# Patient Record
Sex: FEMALE | Race: WHITE | NOT HISPANIC OR LATINO | Employment: UNEMPLOYED | URBAN - NONMETROPOLITAN AREA
[De-identification: names, ages, dates, MRNs, and addresses within clinical notes are randomized per-mention and may not be internally consistent; named-entity substitution may affect disease eponyms.]

---

## 2019-04-05 ENCOUNTER — APPOINTMENT (OUTPATIENT)
Dept: MAMMOGRAPHY | Facility: HOSPITAL | Age: 64
End: 2019-04-05

## 2019-04-12 ENCOUNTER — APPOINTMENT (OUTPATIENT)
Dept: MAMMOGRAPHY | Facility: HOSPITAL | Age: 64
End: 2019-04-12

## 2019-04-17 ENCOUNTER — HOSPITAL ENCOUNTER (OUTPATIENT)
Dept: MAMMOGRAPHY | Facility: HOSPITAL | Age: 64
Discharge: HOME | End: 2019-04-17
Admitting: INTERNAL MEDICINE

## 2019-04-17 DIAGNOSIS — Z12.39 SCREENING BREAST EXAMINATION: ICD-10-CM

## 2019-04-17 PROCEDURE — 77063 BREAST TOMOSYNTHESIS BI: CPT | Performed by: RADIOLOGY

## 2019-04-17 PROCEDURE — 77067 SCR MAMMO BI INCL CAD: CPT | Performed by: RADIOLOGY

## 2019-04-17 PROCEDURE — 77063 BREAST TOMOSYNTHESIS BI: CPT

## 2019-04-17 PROCEDURE — 77067 SCR MAMMO BI INCL CAD: CPT

## 2020-12-28 RX ORDER — DEXAMETHASONE 0.5 MG/1
TABLET ORAL
Qty: 45 TABLET | Refills: 1 | Status: SHIPPED | OUTPATIENT
Start: 2020-12-28 | End: 2021-01-13

## 2020-12-28 NOTE — TELEPHONE ENCOUNTER
Pt called needs a refills on Dexamethasone 005 mg tablet 1 tablet every other day, Dexamethasone 0.75 mg 1 po every other day. Pt last seen 07/13/20 pt net appt 01/13/21

## 2021-01-13 ENCOUNTER — OFFICE VISIT (OUTPATIENT)
Dept: ENDOCRINOLOGY | Facility: CLINIC | Age: 66
End: 2021-01-13

## 2021-01-13 VITALS — WEIGHT: 204 LBS | SYSTOLIC BLOOD PRESSURE: 120 MMHG | DIASTOLIC BLOOD PRESSURE: 78 MMHG | BODY MASS INDEX: 39.84 KG/M2

## 2021-01-13 DIAGNOSIS — E27.49 GLUCOCORTICOID DEFICIENCY (HCC): Primary | Chronic | ICD-10-CM

## 2021-01-13 PROCEDURE — 99442 PR PHYS/QHP TELEPHONE EVALUATION 11-20 MIN: CPT | Performed by: PHYSICIAN ASSISTANT

## 2021-01-13 RX ORDER — DEXAMETHASONE 0.5 MG/1
0.5 TABLET ORAL EVERY OTHER DAY
Start: 2021-01-13 | End: 2021-10-11

## 2021-01-13 RX ORDER — PROMETHAZINE HYDROCHLORIDE 25 MG/1
25 TABLET ORAL 3 TIMES DAILY PRN
COMMUNITY
Start: 2021-01-07

## 2021-01-13 RX ORDER — HYDROXYZINE HYDROCHLORIDE 25 MG/1
25 TABLET, FILM COATED ORAL NIGHTLY
COMMUNITY
Start: 2021-01-07

## 2021-01-13 RX ORDER — DEXAMETHASONE 0.5 MG/1
0.5 TABLET ORAL EVERY OTHER DAY
Start: 2021-01-13 | End: 2021-01-13

## 2021-01-13 RX ORDER — AMLODIPINE BESYLATE 5 MG/1
TABLET ORAL
COMMUNITY
Start: 2020-11-30

## 2021-01-13 RX ORDER — INFLUENZA A VIRUS A/MICHIGAN/45/2015 X-275 (H1N1) ANTIGEN (FORMALDEHYDE INACTIVATED), INFLUENZA A VIRUS A/SINGAPORE/INFIMH-16-0019/2016 IVR-186 (H3N2) ANTIGEN (FORMALDEHYDE INACTIVATED), INFLUENZA B VIRUS B/PHUKET/3073/2013 ANTIGEN (FORMALDEHYDE INACTIVATED), AND INFLUENZA B VIRUS B/MARYLAND/15/2016 BX-69A ANTIGEN (FORMALDEHYDE INACTIVATED) 60; 60; 60; 60 UG/.7ML; UG/.7ML; UG/.7ML; UG/.7ML
INJECTION, SUSPENSION INTRAMUSCULAR
COMMUNITY
End: 2021-01-13

## 2021-01-13 RX ORDER — LIDOCAINE 50 MG/G
PATCH TOPICAL
COMMUNITY
Start: 2020-11-27

## 2021-01-13 RX ORDER — CYANOCOBALAMIN 1000 UG/ML
INJECTION, SOLUTION INTRAMUSCULAR; SUBCUTANEOUS
COMMUNITY
Start: 2020-12-08

## 2021-01-13 RX ORDER — CLONIDINE 0.1 MG/24H
PATCH, EXTENDED RELEASE TRANSDERMAL
COMMUNITY
Start: 2020-12-06

## 2021-01-13 RX ORDER — ESOMEPRAZOLE MAGNESIUM 40 MG/1
CAPSULE, DELAYED RELEASE ORAL
COMMUNITY
Start: 2020-12-01

## 2021-01-13 RX ORDER — HYDROCODONE BITARTRATE AND ACETAMINOPHEN 10; 325 MG/1; MG/1
1 TABLET ORAL 3 TIMES DAILY
COMMUNITY
Start: 2021-01-02

## 2021-01-13 RX ORDER — BUMETANIDE 1 MG/1
TABLET ORAL
COMMUNITY
Start: 2020-11-05

## 2021-01-13 RX ORDER — CITALOPRAM 20 MG/1
TABLET ORAL
COMMUNITY
Start: 2020-12-22

## 2021-01-13 RX ORDER — DEXAMETHASONE 0.5 MG/1
0.5 TABLET ORAL DAILY
Qty: 30 TABLET | Refills: 1 | Status: SHIPPED | OUTPATIENT
Start: 2021-01-13 | End: 2021-10-11

## 2021-01-13 RX ORDER — ERGOCALCIFEROL 1.25 MG/1
CAPSULE ORAL
COMMUNITY
Start: 2020-11-02

## 2021-01-13 RX ORDER — LEVOTHYROXINE SODIUM 0.03 MG/1
TABLET ORAL
COMMUNITY
Start: 2020-12-26

## 2021-01-13 NOTE — PROGRESS NOTES
You have chosen to receive care through a telephone visit. Do you consent to use a telephone visit for your medical care today? Yes         Office Note      Date: 2021  Patient Name: Neelam Gibbs  MRN: [id number]  : 1955    Chief Complaint   Patient presents with   • Hypothyroidism       History of Present Illness:   Neelam Gibbs is a 65 y.o. female who presents today for adrenal insufficiency.  She remains on alternating doses of dexamethasone 0.75 mg and 0.5 mg every other day.  She notes more dizziness in the past week.  She notes occasional nausea and takes phenergan PRN.  She reports that weight is relatively stable.  She reports that energy level is poor, but attributes this to her back pain.  She may get spinal stimulator.  She reports about 60% improvement in pain on the trial.  She reports that she is still notes easy bruising.  She reports this began after starting the dexamethasone.  She did not notice this with the hydrocortisone, but reports that she has felt better on the dexamethasone.  She reports having labs with Dr. Gabriella Pena recently.  She reports being told that labs were okay.  She will fax a copy here.    Subjective      The following portions of the patient's history were reviewed and updated as appropriate: allergies, current medications, past family history, past medical history, past social history, past surgical history and problem list.    Objective     Vitals:    21 1448   BP: 120/78   Weight: 92.5 kg (204 lb)   PainSc:   8   PainLoc: Back     Body mass index is 39.84 kg/m².    Physical Exam      Current Outpatient Medications   Medication Instructions   • amLODIPine (NORVASC) 5 MG tablet No dose, route, or frequency recorded.   • bumetanide (BUMEX) 1 MG tablet Takes on Monday, Wednesday and Friday   • citalopram (CeleXA) 20 MG tablet daily   • cloNIDine (CATAPRES-TTS) 0.1 MG/24HR patch No dose, route, or frequency recorded.   • cyanocobalamin 1000 MCG/ML  injection No dose, route, or frequency recorded.   • dexamethasone (DECADRON) 0.5 mg, Oral, Every Other Day, (alternating days with 0.75mg)   • dexamethasone (DECADRON) 0.75 mg, Oral, Every Other Day, (alternating days with 0.5mg)   • dexamethasone (DECADRON) 0.5 mg, Oral, Daily   • esomeprazole (nexIUM) 40 MG capsule No dose, route, or frequency recorded.   • HYDROcodone-acetaminophen (NORCO)  MG per tablet 1 tablet, Oral, 3 Times Daily   • hydrOXYzine (ATARAX) 25 mg, Oral, Nightly   • levothyroxine (SYNTHROID, LEVOTHROID) 25 MCG tablet No dose, route, or frequency recorded.   • lidocaine (LIDODERM) 5 % No dose, route, or frequency recorded.   • promethazine (PHENERGAN) 25 mg, Oral, 3 Times Daily PRN, As needed   • vitamin D (ERGOCALCIFEROL) 1.25 MG (64943 UT) capsule capsule Tuesday only       Assessment / Plan      Assessment & Plan:  1. Glucocorticoid deficiency (CMS/HCC)  She is stable symptomatically.  We discussed decreasing the dexamethasone to 0.5 mg daily to see if this helps with bruising.  Will send in a temporary supply to a local pharmacy.  She will let me know how she does on the lower dose.  - dexamethasone (DECADRON) 0.5 MG tablet; Take 1 tablet by mouth Daily.  Dispense: 30 tablet; Refill: 1    15 minutes spent on phone with patient.    Return in about 1 year (around 1/13/2022) for Next scheduled follow up.    BENJAMIN Samuels  01/13/2021

## 2021-02-09 ENCOUNTER — APPOINTMENT (OUTPATIENT)
Dept: MAMMOGRAPHY | Facility: HOSPITAL | Age: 66
End: 2021-02-09

## 2021-02-22 ENCOUNTER — HOSPITAL ENCOUNTER (OUTPATIENT)
Dept: MAMMOGRAPHY | Facility: HOSPITAL | Age: 66
Discharge: HOME | End: 2021-02-22
Admitting: INTERNAL MEDICINE

## 2021-02-22 DIAGNOSIS — Z23 IMMUNIZATION DUE: ICD-10-CM

## 2021-02-22 DIAGNOSIS — Z12.31 VISIT FOR SCREENING MAMMOGRAM: ICD-10-CM

## 2021-02-22 PROCEDURE — 77063 BREAST TOMOSYNTHESIS BI: CPT | Performed by: RADIOLOGY

## 2021-02-22 PROCEDURE — 77063 BREAST TOMOSYNTHESIS BI: CPT

## 2021-02-22 PROCEDURE — 77067 SCR MAMMO BI INCL CAD: CPT

## 2021-02-22 PROCEDURE — 77067 SCR MAMMO BI INCL CAD: CPT | Performed by: RADIOLOGY

## 2021-10-11 RX ORDER — DEXAMETHASONE 0.5 MG/1
TABLET ORAL
Qty: 45 TABLET | Refills: 3 | Status: SHIPPED | OUTPATIENT
Start: 2021-10-11 | End: 2022-09-19 | Stop reason: SDUPTHER

## 2022-03-03 ENCOUNTER — TRANSCRIBE ORDERS (OUTPATIENT)
Dept: WOUND CARE | Facility: HOSPITAL | Age: 67
End: 2022-03-03

## 2022-03-03 DIAGNOSIS — I89.0 LYMPHEDEMA: Primary | ICD-10-CM

## 2022-04-04 ENCOUNTER — HOSPITAL ENCOUNTER (OUTPATIENT)
Dept: MAMMOGRAPHY | Facility: HOSPITAL | Age: 67
Discharge: HOME | End: 2022-04-04
Admitting: INTERNAL MEDICINE

## 2022-04-04 DIAGNOSIS — Z12.31 VISIT FOR SCREENING MAMMOGRAM: ICD-10-CM

## 2022-04-04 PROCEDURE — 77063 BREAST TOMOSYNTHESIS BI: CPT | Performed by: RADIOLOGY

## 2022-04-04 PROCEDURE — 77063 BREAST TOMOSYNTHESIS BI: CPT

## 2022-04-04 PROCEDURE — 77067 SCR MAMMO BI INCL CAD: CPT | Performed by: RADIOLOGY

## 2022-04-04 PROCEDURE — 77067 SCR MAMMO BI INCL CAD: CPT

## 2022-04-14 ENCOUNTER — APPOINTMENT (OUTPATIENT)
Dept: OCCUPATIONAL THERAPY | Facility: HOSPITAL | Age: 67
End: 2022-04-14

## 2022-04-25 ENCOUNTER — HOSPITAL ENCOUNTER (OUTPATIENT)
Dept: OCCUPATIONAL THERAPY | Facility: HOSPITAL | Age: 67
Setting detail: THERAPIES SERIES
Discharge: HOME | End: 2022-04-25

## 2022-04-25 DIAGNOSIS — I89.0 LYMPHEDEMA OF BOTH LOWER EXTREMITIES: ICD-10-CM

## 2022-04-25 DIAGNOSIS — Q82.0 HEREDITARY LYMPHEDEMA: Primary | ICD-10-CM

## 2022-04-25 PROCEDURE — 97166 OT EVAL MOD COMPLEX 45 MIN: CPT

## 2022-04-25 PROCEDURE — 97535 SELF CARE MNGMENT TRAINING: CPT

## 2022-04-25 NOTE — THERAPY EVALUATION
"Outpatient Occupational Therapy Lymphedema Initial Evaluation   [facility]     Patient Name: Neelam Gibbs  : 1955  MRN: [id number]  Today's Date: 2022      Visit Date: 2022    Patient Active Problem List   Diagnosis   • Glucocorticoid deficiency (HCC)        Past Medical History:   Diagnosis Date   • Acute renal failure (ARF) (HCC)     after allergic reaction to Bactrim   • Adrenal cortical hypofunction (HCC)    • Anxiety and depression    • Chronic kidney disease (CKD)    • DDD (degenerative disc disease), lumbosacral    • DJD (degenerative joint disease)    • Hyperlipidemia    • Hypothyroidism    • Peripheral neuropathy         Past Surgical History:   Procedure Laterality Date   • BACK SURGERY     • BREAST BIOPSY Left     neg   • EYE SURGERY Bilateral    • SHOULDER SURGERY Left    • TOTAL KNEE ARTHROPLASTY Left    • WRIST SURGERY Right          Visit Dx:     ICD-10-CM ICD-9-CM   1. Hereditary lymphedema  Q82.0 757.0   2. Lymphedema of both lower extremities  I89.0 457.1        Patient History     Row Name 22 1400             History    Chief Complaint Balance Problems;Burn;Numbness;Tightness;Swelling;Pain;Muscle tenderness;Muscle weakness;Joint stiffness;Joint swelling;Impaired sensation;Fatigue/poor endurance;Falls/history of falls;Difficulty with daily activities;Difficulty Walking  -AB      Type of Pain Back pain;Lower Extremity / Leg  -AB      Date Current Problem(s) Began --  ~2 years ago  -AB      Brief Description of Current Complaint Pt. reports swelling that began around 2 years ago. She noticed that symptoms worsened after kidney failures. It has gotten progressively worse and causes increased tenderness and pain.  -AB      Patient/Caregiver Goals Relieve pain;Return to prior level of function;Improve mobility;Improve strength;Know what to do to help the symptoms;Decrease swelling  'improve balance so I'm not scared of walking\"  -AB      Current Tobacco Use yes  -AB      " Smoking Status none  -AB      Patient's Rating of General Health Poor  -AB      Hand Dominance right-handed  -AB      Patient seeing anyone else for problem(s)? PCP  -AB      How has patient tried to help current problem? cold compresses, ice baths, aspercream w/ lidocaine  -AB      What clinical tests have you had for this problem? --  none  -AB      Related/Recent Hospitalizations No  -AB      Are you or can you be pregnant No  -AB              Pain     Pain Location Back;Leg;Foot;Knee  -AB      Pain at Present 0  Reports taking medication prior to coming for evaluation.  -AB      Pain at Best 0  -AB      Pain at Worst 10  -AB      Pain Frequency Constant/continuous  -AB      Pain Description Burning;Cramping;Discomfort;Heaviness;Numbness;Pressure;Squeezing;Sore;Spasm;Radiating;Tender;Throbbing;Tightness;Tiring;Pins and needles  -AB      What Performance Factors Make the Current Problem(s) WORSE? standing for long periods of time, walking  -AB      What Performance Factors Make the Current Problem(s) BETTER? pain medication, elevation  -AB      Is your sleep disturbed? Yes  -AB      What position do you sleep in? Other (comment)  sitting up in a recliner  -AB      Difficulties at work? retired/disabled  -AB      Difficulties with ADL's? yes  -AB      Difficulties with recreational activities? yes  -AB              Fall Risk Assessment    Any falls in the past year: Yes  -AB      Number of falls reported in the last 12 months 2  -AB      Factors that contributed to the fall: Tripped;Lost balance;Uneven surface;Slippery surface  -AB      Does patient have a fear of falling Yes (comment)  -AB              Services    Prior Rehab/Home Health Experiences Yes  -AB      Where was the prior experience with Rehab/Home Health [facility]  -AB      Are you currently receiving Home Health services No  -AB      Do you plan to receive Home Health services in the near future No  -AB              Daily Activities    Primary  Language English  -AB      Are you able to read Yes  -AB      Are you able to write Yes  -AB      Patient is concerned about/has problems with Bed Mobility;Climbing Stairs;Coordination;Difficulty with self care (i.e. bathing, dressing, toileting:;Flexibility;Performing home management (household chores, shopping, care of dependents);Walking;Grasping objects lifting;Repetitive movements of the hand, arm, shoulder;Reaching over head  -AB      Does patient have problems with the following? Depression;Anxiety  -AB              Safety    Are you being hurt, hit, or frightened by anyone at home or in your life? No  -AB      Are you being neglected by a caregiver No  -AB      Have you had any of the following issues with Depression;Anxiety  -AB            User Key  (r) = Recorded By, (t) = Taken By, (c) = Cosigned By    Initials Name Provider Type    Felisa Hardwick OT Occupational Therapist                 Lymphedema     Row Name 04/25/22 1400             Subjective Pain    Able to rate subjective pain? yes  -AB      Pre-Treatment Pain Level 0  -AB      Subjective Pain Comment w/ pain medication  -AB              Subjective Comments    Subjective Comments Pt. presents for initial evaluation this date w/ s/s of lymphedema in BLE's and trunk. Pt. reports that she has been experiencing swelling for the past two years that has gotten progressively worse w/ increased pain.  -AB              Lymphedema Assessment    Lymphedema Classification RLE:;LLE:;Trunk:;stage 2 (Spontaneously Irreversible);secondary  -AB      Lymphedema Precautions kidney disease  -AB              LLIS - Physical Concerns    The amount of pain associated with my lymphedema is: 4  -AB      The amount of limb heaviness associated with my lymphedema is: 4  -AB      The amount of skin tightness associated with my lymphedema is: 4  -AB      The size of my swollen limb(s) seems: 4  -AB      Lymphedema affects the movement of my swollen limb(s): 4   "-AB      The strength in my swollen limb(s) is: 4  -AB              LLIS - Psychosocial Concerns    Lymphedema affects my body image (i.e., \"how I think I look\"). 4  -AB      Lymphedema affects my socializing with others. 4  -AB      Lymphedema affects my intimate relations with spouse or partner (rate 0 if not applicable 4  -AB      Lymphedema \"gets me down\" (i.e., depression, frustration, or anger) 4  -AB      I must rely on others for help due to my lymphedema. 3  -AB      I know what to do to manage my lymphedema 1  -AB              LLIS - Functional Concerns    Lymphedema affects my ability to perform self-care activities (i.e. eating, dressing, hygiene) 2  -AB      Lymphedema affects my ability to perform routine home or work-related activities. 3  -AB      Lymphedema affects my performance of preferred leisure activities. 4  -AB      Lymphedema affects proper fit of clothing/shoes 4  -AB      Lymphedema affects my sleep 4  -AB              General ROM    GENERAL ROM COMMENTS BUE AROM impaired ~50%, BLE's impaired ~75%  -AB              Lymphedema Edema Assessment    Ptting Edema Category By grade out of 4;By severity  -AB      Pitting Edema + 4/4;Severe  -AB      Stemmer Sign bilateral:;negative  -AB      [place] Hump bilateral:;negative  -AB              Skin Changes/Observations    Location/Assessment Lower Extremity  -AB      Lower Extremity Conditions bilateral:;hairless;shiny;clean  -AB      Lower Extremity Color/Pigment bilateral:;red;purple;non-blanchable;hypopigmented;brawny;woody  -AB      Skin Observations Comment blister noted to RLE shin  -AB              Lymphedema Sensation    Lymphedema Sensation Reports RLE:;numbness;tingling  -AB      Lymphedema Sensation Tests light touch  -AB      Lymphedema Light Touch RLE:;absent sensation;LLE:;mild impairment  -AB              Lymphedema Pulses/Capillary Refill    Lymphedema Pulses/Capillary Refill capillary refill  -AB      Capillary Refill lower " extremity capillary refill  -AB      Lower Extremity Capillary Refill left:;right:;less than 3 seconds  -AB              Lymphedema Measurements    Measurement Type(s) Quick Girth;Circumferential  -AB      Quick Girth Areas Lower extremities  -AB      Circumferential Areas Trunk  -AB              LLE Quick Girth (cm)    Met-heads 20 cm  -AB      Mid foot 23 cm  -AB      Smallest ankle 27 cm  -AB      Largest calf 46.4 cm  -AB      Tib tuberosity 48.3 cm  -AB      Mid patella 56 cm  -AB      Distal thigh 62.4 cm  -AB      Other 1 35.7 cm  -AB      Other 2 42.2 cm  -AB              RLE Quick Girth (cm)    Met-heads 19.7 cm  -AB      Mid foot 23 cm  -AB      Smallest ankle 27.1 cm  -AB      Largest calf 47.8 cm  -AB      Tib tuberosity 52.2 cm  -AB      Mid patella 56.5 cm  -AB      Distal thigh 66 cm  -AB      Other 1 41 cm  -AB      Other 2 45.8 cm  -AB      RLE Quick Girth Total 379.1  -AB              Trunk Circumferential (cm)    Measurement Location 1 Navel  -AB      Trunk 1 131.5 cm  -AB      Trunk Circumferential Total 131.5 cm  -AB              Lymphedema Life Impact Scale Totals    A.  Total Q1 - Q17 (Do not include Q18) 61  -AB      B.  Total number of questions answered (Q1-Q17) 17  -AB      C. Divide A by B 3.59  -AB      D. Multiple C by 25 89.75  -AB            User Key  (r) = Recorded By, (t) = Taken By, (c) = Cosigned By    Initials Name Provider Type    Felisa Hardwick, OT Occupational Therapist                        Therapy Education  Given: HEP, Edema management, Symptoms/condition management  Program: New  How Provided: Verbal, Demonstration  Provided to: Patient  Level of Understanding: Verbalized         OT Goals     Row Name 04/25/22 1600          OT Short Term Goals    STG Date to Achieve 05/25/22  -AB     STG 1 Pt. will be familiar w/ precautions, skin care, and self management of lymphedema.  -AB     STG 2 Pt. will decrease pitting edema to3to4/4 to reduce risk of infection.  -AB      STG 3 Pt. will reduce overall girth by 5cm to decrease risk of infection.  -AB     STG 4 Pt. will be familiar w/ HEP to assist w/ improved lymphatic flow.  -AB            Long Term Goals    LTG Date to Achieve 07/25/22  -AB     LTG 1 Pt./ will be independent w/ short stretch compression bandaging for girth reduction.  -AB     LTG 2 Pt. will decrease pitting edema to 3/4 to reduce risk of infection.  -AB     LTG 3 Pt. will reduce overall girth by 10cm to decrease risk of infection.  -AB     LTG 4 Pt./ will be independent w/ donning/doffing compression garment.  -AB     LTG 5 Pt. will be independent w/ lymphedema management and HEP.  -AB            Time Calculation    OT Goal Re-Cert Due Date 07/25/22  -AB           User Key  (r) = Recorded By, (t) = Taken By, (c) = Cosigned By    Initials Name Provider Type    Felisa Hardwick, OT Occupational Therapist                 OT Assessment/Plan     Row Name 04/25/22 1639          OT Assessment    Functional Limitations Decreased safety during functional activities;Limitations in functional capacity and performance;Performance in leisure activities;Impaired gait;Performance in self-care ADL;Limitation in home management  -AB     Impairments Balance;Coordination;Edema;Endurance;Gait;Impaired flexibility;Impaired lymphatic circulation;Muscle strength;Pain;Range of motion;Sensation  -AB     OT Diagnosis Lymphedema  -AB     OT Rehab Potential Good  -AB     Patient/caregiver participated in establishment of treatment plan and goals Yes  -AB     Patient would benefit from skilled therapy intervention Yes  -AB            OT Plan    OT Frequency 2x/week  -AB     Predicted Duration of Therapy Intervention (OT) 90 days  -AB     Planned CPT's? OT EVAL MOD COMPLEXITY: 95722;OT RE-EVAL: 26983;OT THER ACT EA 15 MIN: 49168DL;OT THER PROC EA 15 MIN: 93281AM;OT SELF CARE/MGMT/TRAIN 15 MIN: 17487;OT MANUAL THERAPY EA 15 MIN: 36773;OT VASOPNEUMATIC DEVICE: 15653   lymphedema management  -AB     Planned Therapy Interventions (Optional Details) home exercise program;patient/family education;manual therapy techniques  -AB     OT Plan Comments Pt. will benefit from skilled OT services to address s/s of lymphedema in BLE's, requiring tx 2x/week for approximately 90 days.  -AB           User Key  (r) = Recorded By, (t) = Taken By, (c) = Cosigned By    Initials Name Provider Type    AB Felisa Da Silva, OT Occupational Therapist                          Time Calculation:   OT Start Time: 1350  OT Stop Time: 1500  OT Time Calculation (min): 70 min  OT Non-Billable Time (min): 60 min  Total Timed Code Minutes- OT: 70 minute(s)     Therapy Charges for Today     Code Description Service Date Service Provider Modifiers Qty    38560773522  OT EVAL MOD COMPLEXITY 4 4/25/2022 Felisa Da Silva OT GO, KX 1    [id number]  OT SELF CARE/MGMT/TRAIN EA 15 MIN 4/25/2022 Felisa Da Silva OT GO, KX 1                    Felisa Da Silva OT  4/25/2022

## 2022-04-27 ENCOUNTER — HOSPITAL ENCOUNTER (OUTPATIENT)
Dept: OCCUPATIONAL THERAPY | Facility: HOSPITAL | Age: 67
Setting detail: THERAPIES SERIES
Discharge: HOME | End: 2022-04-27

## 2022-04-27 DIAGNOSIS — Q82.0 HEREDITARY LYMPHEDEMA: Primary | ICD-10-CM

## 2022-04-27 DIAGNOSIS — I89.0 LYMPHEDEMA OF BOTH LOWER EXTREMITIES: ICD-10-CM

## 2022-04-27 PROCEDURE — 97140 MANUAL THERAPY 1/> REGIONS: CPT

## 2022-04-27 NOTE — THERAPY TREATMENT NOTE
Outpatient Occupational Therapy Lymphedema Treatment Note  [facility]     Patient Name: Neelam Gibbs  : 1955  MRN: [id number]  Today's Date: 2022      Visit Date: 2022    Patient Active Problem List   Diagnosis   • Glucocorticoid deficiency (HCC)        Past Medical History:   Diagnosis Date   • Acute renal failure (ARF) (HCC)     after allergic reaction to Bactrim   • Adrenal cortical hypofunction (HCC)    • Anxiety and depression    • Chronic kidney disease (CKD)    • DDD (degenerative disc disease), lumbosacral    • DJD (degenerative joint disease)    • Hyperlipidemia    • Hypothyroidism    • Peripheral neuropathy         Past Surgical History:   Procedure Laterality Date   • BACK SURGERY     • BREAST BIOPSY Left     neg   • EYE SURGERY Bilateral    • SHOULDER SURGERY Left    • TOTAL KNEE ARTHROPLASTY Left    • WRIST SURGERY Right          Visit Dx:      ICD-10-CM ICD-9-CM   1. Hereditary lymphedema  Q82.0 757.0   2. Lymphedema of both lower extremities  I89.0 457.1        Lymphedema     Row Name 22 0900             Subjective Pain    Able to rate subjective pain? yes  -AB      Pre-Treatment Pain Level 8  -AB      Post-Treatment Pain Level 4  -AB      Subjective Pain Comment back  -AB              Subjective Comments    Subjective Comments Pt. presents for initial treatment this date w/ c/o 810 back pain. She reports that her legs are tender to touch.  -AB              Lymphedema Assessment    Lymphedema Classification RLE:;LLE:;Trunk:;stage 2 (Spontaneously Irreversible);secondary  -AB      Lymphedema Precautions kidney disease  -AB              Lymphedema Edema Assessment    Ptting Edema Category By grade out of 4;By severity  -AB      Pitting Edema + 4/4;Severe  -AB      Stemmer Sign bilateral:;negative  -AB      [place] Hump bilateral:;negative  -AB              Skin Changes/Observations    Location/Assessment Lower Extremity  -AB      Lower Extremity Conditions  bilateral:;hairless;shiny;clean  -AB      Lower Extremity Color/Pigment bilateral:;red;purple;non-blanchable;hypopigmented;brawny;woody  -AB              Lymphedema Sensation    Lymphedema Sensation Reports RLE:;numbness;tingling  -AB      Lymphedema Sensation Tests light touch  -AB      Lymphedema Light Touch RLE:;absent sensation;LLE:;mild impairment  -AB              Lymphedema Pulses/Capillary Refill    Lymphedema Pulses/Capillary Refill capillary refill  -AB      Capillary Refill lower extremity capillary refill  -AB      Lower Extremity Capillary Refill left:;right:;less than 3 seconds  -AB              Lymphedema Measurements    Measurement Type(s) Quick Girth;Circumferential  -AB      Quick Girth Areas Lower extremities  -AB      Circumferential Areas Trunk  -AB              Trunk Circumferential (cm)    Measurement Location 1 Navel  -AB              Manual Lymphatic Drainage    Manual Lymphatic Drainage extremity treatment;opened regional lymph nodes  -AB      Opened Regional Lymph Nodes inguinal  -AB      Extremity Treatment MLD to full limb  -AB      MLD to Full Limb BLE's  -AB      Manual Therapy MLD to BLE's, abdomen, inguinals  -AB              Compression/Skin Care    Compression/Skin Care skin care;wrapping location;bandaging  -AB      Skin Care moisturizing lotion applied  -AB      Wrapping Location lower extremity  -AB      Wrapping Location LE bilateral:  ankles to knees, peha haft to feet only  -AB      Bandage Layers short-stretch bandages (comment size/quantity);cotton elastic stocking- single layer (comment size);soft foam- 1/4 inch  -AB      Bandaging Technique light compression;moderate compression;circumferential/spiral  -AB      Compression/Skin Care Comments compression pump to BLE's  -AB            User Key  (r) = Recorded By, (t) = Taken By, (c) = Cosigned By    Initials Name Provider Type    AB Felisa Da Silva, OT Occupational Therapist                                       Therapy Education  Given: HEP, Edema management, Symptoms/condition management, Bandaging/dressing change  Program: New  How Provided: Verbal, Demonstration  Provided to: Patient  Level of Understanding: Verbalized                Time Calculation:   OT Start Time: 0900  OT Stop Time: 1000  OT Time Calculation (min): 60 min  OT Non-Billable Time (min): 25 min  Total Timed Code Minutes- OT: 60 minute(s)     Therapy Charges for Today     Code Description Service Date Service Provider Modifiers Qty    [id number] HC OT MANUAL THERAPY EA 15 MIN 4/27/2022 Felisa Da Silva, CARLITA GO, KX 4                      Felisa Da Silva OT  4/27/2022

## 2022-05-02 ENCOUNTER — HOSPITAL ENCOUNTER (OUTPATIENT)
Dept: OCCUPATIONAL THERAPY | Facility: HOSPITAL | Age: 67
Setting detail: THERAPIES SERIES
Discharge: HOME | End: 2022-05-02

## 2022-05-02 DIAGNOSIS — Q82.0 HEREDITARY LYMPHEDEMA: Primary | ICD-10-CM

## 2022-05-02 DIAGNOSIS — I89.0 LYMPHEDEMA OF BOTH LOWER EXTREMITIES: ICD-10-CM

## 2022-05-02 PROCEDURE — 97140 MANUAL THERAPY 1/> REGIONS: CPT

## 2022-05-02 PROCEDURE — 97139 UNLISTED THERAPEUTIC PX: CPT

## 2022-05-02 NOTE — THERAPY TREATMENT NOTE
Outpatient Occupational Therapy Lymphedema Treatment Note  TEVIN Gilliam     Patient Name: Neelam Gibbs  : 1955  MRN: [id number]  Today's Date: 2022      Visit Date: 2022    Patient Active Problem List   Diagnosis   • Glucocorticoid deficiency (HCC)        Past Medical History:   Diagnosis Date   • Acute renal failure (ARF) (HCC)     after allergic reaction to Bactrim   • Adrenal cortical hypofunction (HCC)    • Anxiety and depression    • Chronic kidney disease (CKD)    • DDD (degenerative disc disease), lumbosacral    • DJD (degenerative joint disease)    • Hyperlipidemia    • Hypothyroidism    • Peripheral neuropathy         Past Surgical History:   Procedure Laterality Date   • BACK SURGERY     • BREAST BIOPSY Left     neg   • EYE SURGERY Bilateral    • SHOULDER SURGERY Left    • TOTAL KNEE ARTHROPLASTY Left    • WRIST SURGERY Right          Visit Dx:      ICD-10-CM ICD-9-CM   1. Hereditary lymphedema  Q82.0 757.0   2. Lymphedema of both lower extremities  I89.0 457.1        Lymphedema     Row Name 22 1300             Subjective Pain    Able to rate subjective pain? yes  -AB      Pre-Treatment Pain Level 6  -AB      Subjective Pain Comment back  -AB              Subjective Comments    Subjective Comments Pt. presents for tx this date w/ multi-layer bandages donned bilaterally.  -AB              Lymphedema Assessment    Lymphedema Classification RLE:;LLE:;Trunk:;stage 2 (Spontaneously Irreversible);secondary  -AB      Lymphedema Precautions kidney disease  -AB              Lymphedema Edema Assessment    Ptting Edema Category By grade out of 4;By severity  -AB      Pitting Edema + 4/4;Severe  -AB      Stemmer Sign bilateral:;negative  -AB      [place] Hump bilateral:;negative  -AB              Skin Changes/Observations    Location/Assessment Lower Extremity  -AB      Lower Extremity Conditions bilateral:;hairless;shiny;clean  -AB      Lower Extremity Color/Pigment  bilateral:;red;purple;non-blanchable;hypopigmented;brawny;woody  -AB              Lymphedema Sensation    Lymphedema Sensation Reports RLE:;numbness;tingling  -AB      Lymphedema Sensation Tests light touch  -AB      Lymphedema Light Touch RLE:;absent sensation;LLE:;mild impairment  -AB              Lymphedema Pulses/Capillary Refill    Lymphedema Pulses/Capillary Refill capillary refill  -AB      Capillary Refill lower extremity capillary refill  -AB      Lower Extremity Capillary Refill left:;right:;less than 3 seconds  -AB              Lymphedema Measurements    Measurement Type(s) Quick Girth;Circumferential  -AB      Quick Girth Areas Lower extremities  -AB      Circumferential Areas Trunk  -AB              LLE Quick Girth (cm)    Met-heads 20.2 cm  -AB      Mid foot 22.4 cm  -AB      Smallest ankle 25.8 cm  -AB      Largest calf 40.8 cm  -AB      Tib tuberosity 45.3 cm  -AB      Mid patella 55.5 cm  -AB      Distal thigh 65.5 cm  -AB      Other 1 27.5 cm  -AB      Other 2 34.5 cm  -AB              RLE Quick Girth (cm)    Met-heads 20 cm  -AB      Mid foot 22 cm  -AB      Smallest ankle 25.2 cm  -AB      Largest calf 47.2 cm  -AB      Tib tuberosity 55 cm  -AB      Mid patella 56.8 cm  -AB      Distal thigh 68.8 cm  -AB      Other 1 32.5 cm  -AB      Other 2 39.1 cm  -AB      RLE Quick Girth Total 366.6  -AB              Trunk Circumferential (cm)    Measurement Location 1 Navel  -AB              Manual Lymphatic Drainage    Manual Lymphatic Drainage extremity treatment;opened regional lymph nodes  -AB      Opened Regional Lymph Nodes inguinal  -AB      Extremity Treatment MLD to full limb  -AB      MLD to Full Limb BLE's  -AB      Manual Therapy MLD to BLE's, abdomen, inguinals  -AB              Compression/Skin Care    Compression/Skin Care skin care;wrapping location;bandaging  -AB      Skin Care moisturizing lotion applied  -AB      Wrapping Location lower extremity  -AB      Wrapping Location LE bilateral:   base of toes to knees - peha haft to toes/feet  -AB      Bandage Layers short-stretch bandages (comment size/quantity);cotton elastic stocking- single layer (comment size);soft foam- 1/4 inch  -AB      Bandaging Technique light compression;moderate compression;circumferential/spiral  -AB      Compression/Skin Care Comments compression pump to BLE's  -AB            User Key  (r) = Recorded By, (t) = Taken By, (c) = Cosigned By    Initials Name Provider Type    AB Felisa Da Silva OT Occupational Therapist                                      Therapy Education  Given: HEP, Edema management, Symptoms/condition management, Bandaging/dressing change  Program: Reinforced  How Provided: Verbal, Demonstration  Provided to: Patient  Level of Understanding: Verbalized                Time Calculation:   OT Start Time: 1305  OT Stop Time: 1405  OT Time Calculation (min): 60 min  OT Non-Billable Time (min): 25 min  Total Timed Code Minutes- OT: 60 minute(s)     Therapy Charges for Today     Code Description Service Date Service Provider Modifiers Qty    36024510335 HC OT MANUAL THERAPY EA 15 MIN 5/2/2022 Felisa Da Silva OT GO, KX 3    78278621037 HC OT LYMPHEDEMA MANAGEMENT-15 MIN 5/2/2022 Felisa Da Silva OT KX 1                      Felisa Da Silva OT  5/2/2022

## 2022-05-05 ENCOUNTER — HOSPITAL ENCOUNTER (OUTPATIENT)
Dept: OCCUPATIONAL THERAPY | Facility: HOSPITAL | Age: 67
Setting detail: THERAPIES SERIES
Discharge: HOME | End: 2022-05-05

## 2022-05-05 DIAGNOSIS — I89.0 LYMPHEDEMA OF BOTH LOWER EXTREMITIES: ICD-10-CM

## 2022-05-05 DIAGNOSIS — Q82.0 HEREDITARY LYMPHEDEMA: Primary | ICD-10-CM

## 2022-05-05 PROCEDURE — 97139 UNLISTED THERAPEUTIC PX: CPT

## 2022-05-05 PROCEDURE — 97140 MANUAL THERAPY 1/> REGIONS: CPT

## 2022-05-05 PROCEDURE — 97535 SELF CARE MNGMENT TRAINING: CPT

## 2022-05-05 NOTE — THERAPY TREATMENT NOTE
Outpatient Occupational Therapy Lymphedema Treatment Note  TEVIN Gilliam     Patient Name: Neelam Gibbs  : 1955  MRN: [id number]  Today's Date: 2022      Visit Date: 2022    Patient Active Problem List   Diagnosis   • Glucocorticoid deficiency (HCC)        Past Medical History:   Diagnosis Date   • Acute renal failure (ARF) (HCC)     after allergic reaction to Bactrim   • Adrenal cortical hypofunction (HCC)    • Anxiety and depression    • Chronic kidney disease (CKD)    • DDD (degenerative disc disease), lumbosacral    • DJD (degenerative joint disease)    • Hyperlipidemia    • Hypothyroidism    • Peripheral neuropathy         Past Surgical History:   Procedure Laterality Date   • BACK SURGERY     • BREAST BIOPSY Left     neg   • EYE SURGERY Bilateral    • SHOULDER SURGERY Left    • TOTAL KNEE ARTHROPLASTY Left    • WRIST SURGERY Right          Visit Dx:      ICD-10-CM ICD-9-CM   1. Hereditary lymphedema  Q82.0 757.0   2. Lymphedema of both lower extremities  I89.0 457.1        Lymphedema     Row Name 22 1300             Subjective Pain    Able to rate subjective pain? yes  -BC      Pre-Treatment Pain Level 5  -BC      Subjective Pain Comment Back  -BC              Subjective Comments    Subjective Comments Pt. reports that her legs itched a lot and she tried not to scratch.  -BC              Lymphedema Assessment    Lymphedema Classification RLE:;LLE:;Trunk:;stage 2 (Spontaneously Irreversible);secondary  -BC      Lymphedema Precautions kidney disease  -BC              Lymphedema Edema Assessment    Ptting Edema Category By grade out of 4;By severity  -BC      Pitting Edema + 4/4;Severe  -BC      Stemmer Sign bilateral:;negative  -BC      [place] Hump bilateral:;negative  -BC              Skin Changes/Observations    Location/Assessment Lower Extremity  -BC      Lower Extremity Conditions bilateral:;hairless;shiny;clean  -BC      Lower Extremity Color/Pigment  bilateral:;red;purple;non-blanchable;hypopigmented;brawny;woody  -BC              Lymphedema Sensation    Lymphedema Sensation Reports RLE:;numbness;tingling  -BC      Lymphedema Sensation Tests light touch  -BC      Lymphedema Light Touch RLE:;absent sensation;LLE:;mild impairment  -BC              Lymphedema Pulses/Capillary Refill    Lymphedema Pulses/Capillary Refill capillary refill  -BC      Capillary Refill lower extremity capillary refill  -BC      Lower Extremity Capillary Refill left:;right:;less than 3 seconds  -BC              Lymphedema Measurements    Measurement Type(s) Quick Girth;Circumferential  -BC      Quick Girth Areas Lower extremities  -BC      Circumferential Areas Trunk  -BC              Trunk Circumferential (cm)    Measurement Location 1 Navel  -BC              Manual Lymphatic Drainage    Manual Lymphatic Drainage extremity treatment;opened regional lymph nodes  -BC      Opened Regional Lymph Nodes inguinal  -BC      Extremity Treatment MLD to full limb  -BC      MLD to Full Limb BLE's  -BC      Manual Therapy MLD to BLE's, abd.  -BC              Compression/Skin Care    Compression/Skin Care skin care;wrapping location;bandaging  -BC      Skin Care moisturizing lotion applied  -BC      Wrapping Location lower extremity  -BC      Wrapping Location LE bilateral:  base of toes to knees - peha haft to toes/feet  -BC      Bandage Layers short-stretch bandages (comment size/quantity);cotton elastic stocking- single layer (comment size);soft foam- 1/4 inch  -BC      Bandaging Technique light compression;moderate compression;circumferential/spiral  -BC      Compression/Skin Care Comments Compression pump x BLE's  -BC            User Key  (r) = Recorded By, (t) = Taken By, (c) = Cosigned By    Initials Name Provider Type    Nicolasa Rizo OT Occupational Therapist                         OT Assessment/Plan     Row Name 05/05/22 1024          OT Assessment    Assessment Comments Pt..  positioned in supported recline for manual lymph drainage, compression pump, skin care and multi layered bandaging of BLE's.  -BC           User Key  (r) = Recorded By, (t) = Taken By, (c) = Cosigned By    Initials Name Provider Type    BC Nicolasa Reid OT Occupational Therapist                          Therapy Education  Given: Symptoms/condition management  Program: Reinforced  How Provided: Verbal  Provided to: Patient  Level of Understanding: Verbalized                Time Calculation:   OT Start Time: 1300  OT Stop Time: 1400  OT Time Calculation (min): 60 min     Therapy Charges for Today     Code Description Service Date Service Provider Modifiers Qty    00121406326 HC OT MANUAL THERAPY EA 15 MIN 5/5/2022 Nicolasa Reid OT GO, KX 2    47930940179 HC OT LYMPHEDEMA MANAGEMENT-15 MIN 5/5/2022 Nicolasa Reid OT KX 1    12986717917 HC OT SELF CARE/MGMT/TRAIN EA 15 MIN 5/5/2022 Nicolasa Reid OT GO, KX 1                      Nicolasa Reid OT  5/5/2022

## 2022-05-10 ENCOUNTER — HOSPITAL ENCOUNTER (OUTPATIENT)
Dept: OCCUPATIONAL THERAPY | Facility: HOSPITAL | Age: 67
Setting detail: THERAPIES SERIES
Discharge: HOME | End: 2022-05-10

## 2022-05-10 DIAGNOSIS — Q82.0 HEREDITARY LYMPHEDEMA: Primary | ICD-10-CM

## 2022-05-10 DIAGNOSIS — I89.0 LYMPHEDEMA OF BOTH LOWER EXTREMITIES: ICD-10-CM

## 2022-05-10 PROCEDURE — 97140 MANUAL THERAPY 1/> REGIONS: CPT

## 2022-05-10 PROCEDURE — 97139 UNLISTED THERAPEUTIC PX: CPT

## 2022-05-10 NOTE — THERAPY TREATMENT NOTE
Outpatient Occupational Therapy Lymphedema Treatment Note  TEVIN Gilliam     Patient Name: Neelam Gibbs  : 1955  MRN: [id number]  Today's Date: 5/10/2022      Visit Date: 05/10/2022    Patient Active Problem List   Diagnosis   • Glucocorticoid deficiency (HCC)        Past Medical History:   Diagnosis Date   • Acute renal failure (ARF) (HCC)     after allergic reaction to Bactrim   • Adrenal cortical hypofunction (HCC)    • Anxiety and depression    • Chronic kidney disease (CKD)    • DDD (degenerative disc disease), lumbosacral    • DJD (degenerative joint disease)    • Hyperlipidemia    • Hypothyroidism    • Peripheral neuropathy         Past Surgical History:   Procedure Laterality Date   • BACK SURGERY     • BREAST BIOPSY Left     neg   • EYE SURGERY Bilateral    • SHOULDER SURGERY Left    • TOTAL KNEE ARTHROPLASTY Left    • WRIST SURGERY Right          Visit Dx:      ICD-10-CM ICD-9-CM   1. Hereditary lymphedema  Q82.0 757.0   2. Lymphedema of both lower extremities  I89.0 457.1        Lymphedema     Row Name 05/10/22 1300             Subjective Pain    Able to rate subjective pain? yes  -AB      Pre-Treatment Pain Level 8  -AB      Subjective Pain Comment back, BLE's  -AB              Subjective Comments    Subjective Comments Pt. presents to tx w/ no compression donned. She reports that she removed her bandages this morning because her legs were hurting.  -AB              Lymphedema Assessment    Lymphedema Classification RLE:;LLE:;Trunk:;stage 2 (Spontaneously Irreversible);secondary  -AB      Lymphedema Precautions kidney disease  -AB              Lymphedema Edema Assessment    Ptting Edema Category By grade out of 4;By severity  -AB      Pitting Edema + 4/4;Severe  -AB      Stemmer Sign bilateral:;negative  -AB      [place] Hump bilateral:;negative  -AB              Skin Changes/Observations    Location/Assessment Lower Extremity  -AB      Lower Extremity Conditions  bilateral:;hairless;shiny;clean  -AB      Lower Extremity Color/Pigment bilateral:;red;purple;non-blanchable;hypopigmented;brawny;woody  -AB              Lymphedema Sensation    Lymphedema Sensation Reports RLE:;numbness;tingling  -AB      Lymphedema Sensation Tests light touch  -AB      Lymphedema Light Touch RLE:;absent sensation;LLE:;mild impairment  -AB              Lymphedema Pulses/Capillary Refill    Lymphedema Pulses/Capillary Refill capillary refill  -AB      Capillary Refill lower extremity capillary refill  -AB      Lower Extremity Capillary Refill left:;right:;less than 3 seconds  -AB              Lymphedema Measurements    Measurement Type(s) Quick Girth;Circumferential  -AB      Quick Girth Areas Lower extremities  -AB      Circumferential Areas Trunk  -AB              LLE Quick Girth (cm)    Met-heads 19.2 cm  -AB      Mid foot 21.4 cm  -AB      Smallest ankle 25.4 cm  -AB      Largest calf 42 cm  -AB      Tib tuberosity 46 cm  -AB      Mid patella 55.7 cm  -AB      Other 1 28.2 cm  -AB      Other 2 35.5 cm  -AB              RLE Quick Girth (cm)    Met-heads 18.8 cm  -AB      Mid foot 20.7 cm  -AB      Smallest ankle 24.6 cm  -AB      Largest calf 43.6 cm  -AB      Tib tuberosity 51.9 cm  -AB      Mid patella 54.6 cm  -AB      Other 1 32.9 cm  -AB      Other 2 38.6 cm  -AB      RLE Quick Girth Total 285.7  -AB              Trunk Circumferential (cm)    Measurement Location 1 Navel  -AB              Manual Lymphatic Drainage    Manual Lymphatic Drainage extremity treatment;opened regional lymph nodes  -AB      Opened Regional Lymph Nodes inguinal  -AB      Extremity Treatment MLD to full limb  -AB      MLD to Full Limb BLE's  -AB      Manual Lymphatic Drainage Comments vibration to BLEs w/ hypersensitivity noted  -AB      Manual Therapy MLD to BLE's, abdomen, inguinals  -AB              Compression/Skin Care    Compression/Skin Care skin care;wrapping location;bandaging  -AB      Skin Care moisturizing  lotion applied  -AB      Wrapping Location lower extremity  -AB      Wrapping Location LE bilateral:  base of toes to knees  -AB      Bandage Layers short-stretch bandages (comment size/quantity);cotton elastic stocking- single layer (comment size);soft foam- 1/4 inch  -AB      Bandaging Technique light compression;moderate compression;circumferential/spiral  -AB      Compression/Skin Care Comments compression pump to BLE's  -AB            User Key  (r) = Recorded By, (t) = Taken By, (c) = Cosigned By    Initials Name Provider Type    Felisa Hardwick OT Occupational Therapist                                      Therapy Education  Given: HEP, Edema management, Symptoms/condition management, Bandaging/dressing change  Program: Reinforced  How Provided: Verbal, Demonstration  Provided to: Patient  Level of Understanding: Verbalized                Time Calculation:   OT Start Time: 1300  OT Stop Time: 1410  OT Time Calculation (min): 70 min  OT Non-Billable Time (min): 25 min  Total Timed Code Minutes- OT: 70 minute(s)     Therapy Charges for Today     Code Description Service Date Service Provider Modifiers Qty    91550408133 HC OT MANUAL THERAPY EA 15 MIN 5/10/2022 Felisa Da Silva OT GO, KX 4    12739149225 HC OT LYMPHEDEMA MANAGEMENT-15 MIN 5/10/2022 Felisa Da Silva OT KX 1                      Felisa Da Silva OT  5/10/2022

## 2022-05-12 ENCOUNTER — HOSPITAL ENCOUNTER (OUTPATIENT)
Dept: OCCUPATIONAL THERAPY | Facility: HOSPITAL | Age: 67
Setting detail: THERAPIES SERIES
Discharge: HOME | End: 2022-05-12

## 2022-05-12 DIAGNOSIS — I89.0 LYMPHEDEMA OF BOTH LOWER EXTREMITIES: ICD-10-CM

## 2022-05-12 DIAGNOSIS — Q82.0 HEREDITARY LYMPHEDEMA: Primary | ICD-10-CM

## 2022-05-12 PROCEDURE — 97140 MANUAL THERAPY 1/> REGIONS: CPT

## 2022-05-12 PROCEDURE — 97139 UNLISTED THERAPEUTIC PX: CPT

## 2022-05-12 PROCEDURE — 97535 SELF CARE MNGMENT TRAINING: CPT

## 2022-05-12 PROCEDURE — 97016 VASOPNEUMATIC DEVICE THERAPY: CPT

## 2022-05-12 NOTE — THERAPY TREATMENT NOTE
Outpatient Occupational Therapy Lymphedema Treatment Note  TEVIN Gilliam     Patient Name: Neelam Gibbs  : 1955  MRN: [id number]  Today's Date: 2022      Visit Date: 2022    Patient Active Problem List   Diagnosis   • Glucocorticoid deficiency (HCC)        Past Medical History:   Diagnosis Date   • Acute renal failure (ARF) (HCC)     after allergic reaction to Bactrim   • Adrenal cortical hypofunction (HCC)    • Anxiety and depression    • Chronic kidney disease (CKD)    • DDD (degenerative disc disease), lumbosacral    • DJD (degenerative joint disease)    • Hyperlipidemia    • Hypothyroidism    • Peripheral neuropathy         Past Surgical History:   Procedure Laterality Date   • BACK SURGERY     • BREAST BIOPSY Left     neg   • EYE SURGERY Bilateral    • SHOULDER SURGERY Left    • TOTAL KNEE ARTHROPLASTY Left    • WRIST SURGERY Right          Visit Dx:      ICD-10-CM ICD-9-CM   1. Hereditary lymphedema  Q82.0 757.0   2. Lymphedema of both lower extremities  I89.0 457.1        Lymphedema     Row Name 22 1300             Subjective Pain    Able to rate subjective pain? yes  -BC      Pre-Treatment Pain Level 7  -BC      Subjective Pain Comment Back BLE's  -BC              Subjective Comments    Subjective Comments Pt. presents with no new complaints voiced.  -BC              Lymphedema Assessment    Lymphedema Classification RLE:;LLE:;Trunk:;stage 2 (Spontaneously Irreversible);secondary  -BC      Lymphedema Precautions kidney disease  -BC              Lymphedema Edema Assessment    Ptting Edema Category By grade out of 4;By severity  -BC      Pitting Edema + 4/4;Severe  -BC      Stemmer Sign bilateral:;negative  -BC      [place] Hump bilateral:;negative  -BC              Skin Changes/Observations    Location/Assessment Lower Extremity  -BC      Lower Extremity Conditions bilateral:;hairless;shiny;clean  -BC      Lower Extremity Color/Pigment  bilateral:;red;purple;non-blanchable;hypopigmented;brawny;woody  -BC              Lymphedema Sensation    Lymphedema Sensation Reports RLE:;numbness;tingling  -BC      Lymphedema Sensation Tests light touch  -BC      Lymphedema Light Touch RLE:;absent sensation;LLE:;mild impairment  -BC              Lymphedema Pulses/Capillary Refill    Lymphedema Pulses/Capillary Refill capillary refill  -BC      Capillary Refill lower extremity capillary refill  -BC      Lower Extremity Capillary Refill left:;right:;less than 3 seconds  -BC              Lymphedema Measurements    Measurement Type(s) Quick Girth;Circumferential  -BC      Quick Girth Areas Lower extremities  -BC      Circumferential Areas Trunk  -BC              Trunk Circumferential (cm)    Measurement Location 1 Navel  -BC              Manual Lymphatic Drainage    Manual Lymphatic Drainage extremity treatment;opened regional lymph nodes  -BC      Opened Regional Lymph Nodes inguinal  -BC      Extremity Treatment MLD to full limb  -BC      MLD to Full Limb BLE's  -BC      Manual Lymphatic Drainage Comments Vibration as tolerated.  -BC      Manual Therapy MLD to BLE's , abd. inguinals.  -BC              Compression/Skin Care    Compression/Skin Care skin care;wrapping location;bandaging  -BC      Skin Care moisturizing lotion applied  -BC      Wrapping Location lower extremity  -BC      Wrapping Location LE bilateral:  base of toes to knees  -BC      Bandage Layers short-stretch bandages (comment size/quantity);cotton elastic stocking- single layer (comment size);soft foam- 1/4 inch  -BC      Bandaging Technique light compression;moderate compression;circumferential/spiral  -BC      Compression/Skin Care Comments Compression pump x Abd., BLE's  -BC            User Key  (r) = Recorded By, (t) = Taken By, (c) = Cosigned By    Initials Name Provider Type    Nicolasa Rizo OT Occupational Therapist                         OT Assessment/Plan     Row Name 05/12/22  1600          OT Assessment    Assessment Comments Pt. positioned in supported recline for manual lymph drainage, compression pump, skin care and multi layered bandaging of BLE's.  -BC           User Key  (r) = Recorded By, (t) = Taken By, (c) = Cosigned By    Initials Name Provider Type    Nicolasa Rizo OT Occupational Therapist                          Therapy Education  Given: Symptoms/condition management  Program: Reinforced  How Provided: Verbal  Provided to: Patient  Level of Understanding: Verbalized                Time Calculation:   OT Start Time: 1300  OT Stop Time: 1400  OT Time Calculation (min): 60 min  OT Non-Billable Time (min): 10 min     Therapy Charges for Today     Code Description Service Date Service Provider Modifiers Qty    73895228169 HC OT LYMPHEDEMA MANAGEMENT-15 MIN 5/12/2022 Nicolasa Reid OT KX 1    37140511809 HC OT MANUAL THERAPY EA 15 MIN 5/12/2022 Nicolasa Reid OT GO, KX 1    20755194510 HC OT SELF CARE/MGMT/TRAIN EA 15 MIN 5/12/2022 Nicolasa Reid OT GO KX 1    43181778667 HC OT VASOPNEUMAT DEVIC 1 OR MORE AREAS 5/12/2022 Nicolasa Reid OT GO, KX 1                      Nicolasa Reid OT  5/12/2022

## 2022-05-16 ENCOUNTER — HOSPITAL ENCOUNTER (OUTPATIENT)
Dept: OCCUPATIONAL THERAPY | Facility: HOSPITAL | Age: 67
Setting detail: THERAPIES SERIES
Discharge: HOME | End: 2022-05-16

## 2022-05-16 DIAGNOSIS — Q82.0 HEREDITARY LYMPHEDEMA: Primary | ICD-10-CM

## 2022-05-16 DIAGNOSIS — I89.0 LYMPHEDEMA OF BOTH LOWER EXTREMITIES: ICD-10-CM

## 2022-05-16 PROCEDURE — 97016 VASOPNEUMATIC DEVICE THERAPY: CPT

## 2022-05-16 PROCEDURE — 97140 MANUAL THERAPY 1/> REGIONS: CPT

## 2022-05-16 PROCEDURE — 97535 SELF CARE MNGMENT TRAINING: CPT

## 2022-05-16 PROCEDURE — 97139 UNLISTED THERAPEUTIC PX: CPT

## 2022-05-16 NOTE — THERAPY TREATMENT NOTE
Outpatient Occupational Therapy Lymphedema Treatment Note  TEVIN Gilliam     Patient Name: Neelam Gibbs  : 1955  MRN: [id number]  Today's Date: 2022      Visit Date: 2022    Patient Active Problem List   Diagnosis   • Glucocorticoid deficiency (HCC)        Past Medical History:   Diagnosis Date   • Acute renal failure (ARF) (HCC)     after allergic reaction to Bactrim   • Adrenal cortical hypofunction (HCC)    • Anxiety and depression    • Chronic kidney disease (CKD)    • DDD (degenerative disc disease), lumbosacral    • DJD (degenerative joint disease)    • Hyperlipidemia    • Hypothyroidism    • Peripheral neuropathy         Past Surgical History:   Procedure Laterality Date   • BACK SURGERY     • BREAST BIOPSY Left     neg   • EYE SURGERY Bilateral    • SHOULDER SURGERY Left    • TOTAL KNEE ARTHROPLASTY Left    • WRIST SURGERY Right          Visit Dx:      ICD-10-CM ICD-9-CM   1. Hereditary lymphedema  Q82.0 757.0   2. Lymphedema of both lower extremities  I89.0 457.1        Lymphedema     Row Name 22 1300             Subjective Pain    Able to rate subjective pain? yes  -BC      Pre-Treatment Pain Level 8  -BC      Post-Treatment Pain Level 5  -BC      Subjective Pain Comment Back  -BC              Subjective Comments    Subjective Comments Pt. states she hit her leg on the wheel chair and got an instant bruise.  -BC              Lymphedema Assessment    Lymphedema Classification RLE:;LLE:;Trunk:;stage 2 (Spontaneously Irreversible);secondary  -BC      Lymphedema Precautions kidney disease  -BC              Lymphedema Edema Assessment    Ptting Edema Category By grade out of 4;By severity  -BC      Pitting Edema + 4/4;Severe  -BC      Stemmer Sign bilateral:;negative  -BC      [place] Hump bilateral:;negative  -BC              Skin Changes/Observations    Location/Assessment Lower Extremity  -BC      Lower Extremity Conditions bilateral:;hairless;shiny;clean  -BC      Lower  Extremity Color/Pigment bilateral:;red;purple;non-blanchable;hypopigmented;brawny;woody  -BC              Lymphedema Sensation    Lymphedema Sensation Reports RLE:;numbness;tingling  -BC      Lymphedema Sensation Tests light touch  -BC      Lymphedema Light Touch RLE:;absent sensation;LLE:;mild impairment  -BC              Lymphedema Pulses/Capillary Refill    Lymphedema Pulses/Capillary Refill capillary refill  -BC      Capillary Refill lower extremity capillary refill  -BC      Lower Extremity Capillary Refill left:;right:;less than 3 seconds  -BC              Lymphedema Measurements    Measurement Type(s) Quick Girth;Circumferential  -BC      Quick Girth Areas Lower extremities  -BC      Circumferential Areas Trunk  -BC              LLE Quick Girth (cm)    Met-heads 20.2 cm  -BC      Mid foot 20.5 cm  -BC      Smallest ankle 25.1 cm  -BC      Largest calf 39.1 cm  -BC      Tib tuberosity 47 cm  -BC      Mid patella 54.2 cm  -BC      Distal thigh 64 cm  -BC      Other 1 31 cm  -BC      Other 2 38.2 cm  -BC              RLE Quick Girth (cm)    Met-heads 19.7 cm  -BC      Mid foot 20.7 cm  -BC      Smallest ankle 23.7 cm  -BC      Largest calf 44.5 cm  -BC      Tib tuberosity 46 cm  -BC      Mid patella 59 cm  -BC      Distal thigh 66 cm  -BC      Other 1 32.3 cm  -BC      Other 2 40.1 cm  -BC      RLE Quick Girth Total 352  -BC              Trunk Circumferential (cm)    Measurement Location 1 Navel  -BC              Manual Lymphatic Drainage    Manual Lymphatic Drainage extremity treatment;opened regional lymph nodes  -BC      Opened Regional Lymph Nodes inguinal  -BC      Extremity Treatment MLD to full limb  -BC      MLD to Full Limb BLE's  -BC      Manual Lymphatic Drainage Comments Vibration as tolerated.  -BC      Manual Therapy MLD to BLE's, inguinals, abd.  -BC              Compression/Skin Care    Compression/Skin Care skin care;wrapping location;bandaging  -BC      Skin Care moisturizing lotion applied   -BC      Wrapping Location lower extremity  -BC      Wrapping Location LE bilateral:  base of toes to knees  -BC      Bandage Layers short-stretch bandages (comment size/quantity);cotton elastic stocking- single layer (comment size);soft foam- 1/4 inch  -BC      Bandaging Technique light compression;moderate compression;circumferential/spiral  -BC      Compression/Skin Care Comments Compression pump x abd.  -BC            User Key  (r) = Recorded By, (t) = Taken By, (c) = Cosigned By    Initials Name Provider Type    Nicolasa Rizo OT Occupational Therapist                         OT Assessment/Plan     Row Name 05/16/22 3238          OT Assessment    Assessment Comments Pt. positioned in supported recline for manual lymph drainage, compression pump, skin care and multi layered bandaging of BLE's.  -BC           User Key  (r) = Recorded By, (t) = Taken By, (c) = Cosigned By    Initials Name Provider Type    Nicolasa Rizo OT Occupational Therapist                          Therapy Education  Given: Symptoms/condition management  Program: Reinforced  How Provided: Verbal  Provided to: Patient  Level of Understanding: Verbalized                Time Calculation:   OT Start Time: 1300  OT Stop Time: 1410  OT Time Calculation (min): 70 min  OT Non-Billable Time (min): 10 min     Therapy Charges for Today     Code Description Service Date Service Provider Modifiers Qty    50083803962 HC OT LYMPHEDEMA MANAGEMENT-15 MIN 5/16/2022 Nicolasa Reid OT KX 1    63548154654 HC OT MANUAL THERAPY EA 15 MIN 5/16/2022 Nicolasa Reid OT GO, KX 2    67809760303 HC OT SELF CARE/MGMT/TRAIN EA 15 MIN 5/16/2022 Nicolasa Reid OT GO, KX 1    41447082960 HC OT VASOPNEUMAT DEVIC 1 OR MORE AREAS 5/16/2022 Nicolasa Reid OT GO, KX 1                      Nicolasa Reid OT  5/16/2022

## 2022-05-19 ENCOUNTER — APPOINTMENT (OUTPATIENT)
Dept: OCCUPATIONAL THERAPY | Facility: HOSPITAL | Age: 67
End: 2022-05-19

## 2022-05-24 ENCOUNTER — HOSPITAL ENCOUNTER (OUTPATIENT)
Dept: OCCUPATIONAL THERAPY | Facility: HOSPITAL | Age: 67
Setting detail: THERAPIES SERIES
Discharge: HOME | End: 2022-05-24

## 2022-05-24 DIAGNOSIS — Q82.0 HEREDITARY LYMPHEDEMA: ICD-10-CM

## 2022-05-24 DIAGNOSIS — I89.0 LYMPHEDEMA OF BOTH LOWER EXTREMITIES: Primary | ICD-10-CM

## 2022-05-24 PROCEDURE — 97139 UNLISTED THERAPEUTIC PX: CPT

## 2022-05-24 PROCEDURE — 97140 MANUAL THERAPY 1/> REGIONS: CPT

## 2022-05-24 NOTE — THERAPY TREATMENT NOTE
Outpatient Occupational Therapy Lymphedema Treatment Note  TEVIN Gilliam     Patient Name: Neelam Gibbs  : 1955  MRN: [id number]  Today's Date: 2022      Visit Date: 2022    Patient Active Problem List   Diagnosis   • Glucocorticoid deficiency (HCC)        Past Medical History:   Diagnosis Date   • Acute renal failure (ARF) (HCC)     after allergic reaction to Bactrim   • Adrenal cortical hypofunction (HCC)    • Anxiety and depression    • Chronic kidney disease (CKD)    • DDD (degenerative disc disease), lumbosacral    • DJD (degenerative joint disease)    • Hyperlipidemia    • Hypothyroidism    • Peripheral neuropathy         Past Surgical History:   Procedure Laterality Date   • BACK SURGERY     • BREAST BIOPSY Left     neg   • EYE SURGERY Bilateral    • SHOULDER SURGERY Left    • TOTAL KNEE ARTHROPLASTY Left    • WRIST SURGERY Right          Visit Dx:      ICD-10-CM ICD-9-CM   1. Lymphedema of both lower extremities  I89.0 457.1   2. Hereditary lymphedema  Q82.0 757.0        Lymphedema     Row Name 22 1300             Subjective Pain    Able to rate subjective pain? yes  -AB      Pre-Treatment Pain Level 0  -AB      Subjective Pain Comment denies any pain this date  -AB              Subjective Comments    Subjective Comments Pt. presents w/ no new c/o voiced.  -AB              Lymphedema Assessment    Lymphedema Classification RLE:;LLE:;Trunk:;stage 2 (Spontaneously Irreversible);secondary  -AB      Lymphedema Precautions kidney disease  -AB              Lymphedema Edema Assessment    Ptting Edema Category By grade out of 4;By severity  -AB      Pitting Edema + 4/4;Severe  -AB      Stemmer Sign bilateral:;negative  -AB      [place] Hump bilateral:;negative  -AB              Skin Changes/Observations    Location/Assessment Lower Extremity  -AB      Lower Extremity Conditions bilateral:;hairless;shiny;clean  -AB      Lower Extremity Color/Pigment  bilateral:;red;purple;non-blanchable;hypopigmented;brawny;woody  -AB              Lymphedema Sensation    Lymphedema Sensation Reports RLE:;numbness;tingling  -AB      Lymphedema Sensation Tests light touch  -AB      Lymphedema Light Touch RLE:;absent sensation;LLE:;mild impairment  -AB              Lymphedema Pulses/Capillary Refill    Lymphedema Pulses/Capillary Refill capillary refill  -AB      Capillary Refill lower extremity capillary refill  -AB      Lower Extremity Capillary Refill left:;right:;less than 3 seconds  -AB              Lymphedema Measurements    Measurement Type(s) Quick Girth;Circumferential  -AB      Quick Girth Areas Lower extremities  -AB      Circumferential Areas Trunk  -AB              LLE Quick Girth (cm)    Met-heads 19.2 cm  -AB      Mid foot 21.6 cm  -AB      Smallest ankle 23.9 cm  -AB      Largest calf 43.2 cm  -AB      Tib tuberosity 46.7 cm  -AB      Mid patella 53.7 cm  -AB      Distal thigh 62.2 cm  -AB      Other 1 29.8 cm  -AB      Other 2 38.4 cm  -AB              RLE Quick Girth (cm)    Met-heads 19.7 cm  -AB      Mid foot 21 cm  -AB      Smallest ankle 23 cm  -AB      Largest calf 45.1 cm  -AB      Tib tuberosity 48.3 cm  -AB      Mid patella 55.5 cm  -AB      Distal thigh 64 cm  -AB      Other 1 32.4 cm  -AB      Other 2 40.4 cm  -AB      RLE Quick Girth Total 349.4  -AB              Trunk Circumferential (cm)    Measurement Location 1 Navel  -AB              Manual Lymphatic Drainage    Manual Lymphatic Drainage extremity treatment;opened regional lymph nodes  -AB      Opened Regional Lymph Nodes inguinal  -AB      Extremity Treatment MLD to full limb  -AB      MLD to Full Limb BLE's  -AB      Manual Lymphatic Drainage Comments vibration to BLE's  -AB      Manual Therapy MLD to BLE's, abdomen, inguinals  -AB              Compression/Skin Care    Compression/Skin Care skin care;wrapping location;bandaging  -AB      Skin Care moisturizing lotion applied  -AB      Wrapping  Location lower extremity  -AB      Wrapping Location LE bilateral:  ankles to knees  -AB      Bandage Layers short-stretch bandages (comment size/quantity);cotton elastic stocking- single layer (comment size);soft foam- 1/4 inch  -AB      Bandaging Technique moderate compression;circumferential/spiral  -AB      Compression/Skin Care Comments compression pump to abdomen and BLE's  -AB            User Key  (r) = Recorded By, (t) = Taken By, (c) = Cosigned By    Initials Name Provider Type    AB Felisa Da Silva OT Occupational Therapist                                      Therapy Education  Given: HEP, Edema management, Symptoms/condition management, Bandaging/dressing change  Program: Reinforced  How Provided: Verbal, Demonstration  Provided to: Patient  Level of Understanding: Verbalized                Time Calculation:   OT Start Time: 1305  OT Stop Time: 1415  OT Time Calculation (min): 70 min  OT Non-Billable Time (min): 25 min  Total Timed Code Minutes- OT: 70 minute(s)     Therapy Charges for Today     Code Description Service Date Service Provider Modifiers Qty    80257566950 HC OT MANUAL THERAPY EA 15 MIN 5/24/2022 Felisa Da Silva OT GO, KX 4    80241282343 HC OT LYMPHEDEMA MANAGEMENT-15 MIN 5/24/2022 Felisa Da Silva OT KX 1                      Felisa Da Silva OT  5/24/2022

## 2022-05-26 ENCOUNTER — APPOINTMENT (OUTPATIENT)
Dept: OCCUPATIONAL THERAPY | Facility: HOSPITAL | Age: 67
End: 2022-05-26

## 2022-06-02 ENCOUNTER — HOSPITAL ENCOUNTER (OUTPATIENT)
Dept: OCCUPATIONAL THERAPY | Facility: HOSPITAL | Age: 67
Setting detail: THERAPIES SERIES
Discharge: HOME | End: 2022-06-02

## 2022-06-02 DIAGNOSIS — Q82.0 HEREDITARY LYMPHEDEMA: ICD-10-CM

## 2022-06-02 DIAGNOSIS — I89.0 LYMPHEDEMA OF BOTH LOWER EXTREMITIES: Primary | ICD-10-CM

## 2022-06-02 PROCEDURE — 97535 SELF CARE MNGMENT TRAINING: CPT

## 2022-06-02 PROCEDURE — 97139 UNLISTED THERAPEUTIC PX: CPT

## 2022-06-02 PROCEDURE — 97016 VASOPNEUMATIC DEVICE THERAPY: CPT

## 2022-06-02 PROCEDURE — 97140 MANUAL THERAPY 1/> REGIONS: CPT

## 2022-06-02 NOTE — THERAPY PROGRESS REPORT/RE-CERT
Outpatient Occupational Therapy Lymphedema Progress Note  TEVIN Gilliam     Patient Name: Neelam Gibbs  : 1955  MRN: [id number]  Today's Date: 2022      Visit Date: 2022    Patient Active Problem List   Diagnosis   • Glucocorticoid deficiency (HCC)        Past Medical History:   Diagnosis Date   • Acute renal failure (ARF) (HCC)     after allergic reaction to Bactrim   • Adrenal cortical hypofunction (HCC)    • Anxiety and depression    • Chronic kidney disease (CKD)    • DDD (degenerative disc disease), lumbosacral    • DJD (degenerative joint disease)    • Hyperlipidemia    • Hypothyroidism    • Peripheral neuropathy         Past Surgical History:   Procedure Laterality Date   • BACK SURGERY     • BREAST BIOPSY Left     neg   • EYE SURGERY Bilateral    • SHOULDER SURGERY Left    • TOTAL KNEE ARTHROPLASTY Left    • WRIST SURGERY Right          Visit Dx:      ICD-10-CM ICD-9-CM   1. Lymphedema of both lower extremities  I89.0 457.1   2. Hereditary lymphedema  Q82.0 757.0        Lymphedema     Row Name 22 1300             Subjective Pain    Able to rate subjective pain? yes  -BC      Pre-Treatment Pain Level 8  -BC      Subjective Pain Comment back/legs  -BC              Subjective Comments    Subjective Comments Pt. presents w/complaints of increased pain in back.  -BC              Lymphedema Assessment    Lymphedema Classification RLE:;LLE:;Trunk:;stage 2 (Spontaneously Irreversible);secondary  -BC      Lymphedema Precautions kidney disease  -BC              Lymphedema Edema Assessment    Ptting Edema Category By grade out of 4;By severity  -BC      Pitting Edema + 4/4;Severe  -BC      Stemmer Sign bilateral:;negative  -BC      [place] Hump bilateral:;negative  -BC              Skin Changes/Observations    Location/Assessment Lower Extremity  -BC      Lower Extremity Conditions bilateral:;hairless;shiny;clean  -BC      Lower Extremity Color/Pigment  bilateral:;red;purple;non-blanchable;hypopigmented;brawny;woody  -BC      Skin Observations Comment Well maintained  -BC              Lymphedema Sensation    Lymphedema Sensation Reports RLE:;numbness;tingling  -BC      Lymphedema Sensation Tests light touch  -BC      Lymphedema Light Touch RLE:;absent sensation;LLE:;mild impairment  -BC              Lymphedema Pulses/Capillary Refill    Lymphedema Pulses/Capillary Refill capillary refill  -BC      Capillary Refill lower extremity capillary refill  -BC      Lower Extremity Capillary Refill left:;right:;less than 3 seconds  -BC              Lymphedema Measurements    Measurement Type(s) Quick Girth;Circumferential  -BC      Quick Girth Areas Lower extremities  -BC      Circumferential Areas Trunk  -BC              LLE Quick Girth (cm)    Met-heads 20 cm  -BC      Mid foot 22.6 cm  -BC      Smallest ankle 27 cm  -BC      Largest calf 44 cm  -BC      Tib tuberosity 44 cm  -BC      Mid patella 54.8 cm  -BC      Distal thigh 64 cm  -BC      Other 1 34 cm  -BC      Other 2 42 cm  -BC              RLE Quick Girth (cm)    Met-heads 18.9 cm  -BC      Mid foot 21.1 cm  -BC      Smallest ankle 24.8 cm  -BC      Largest calf 44 cm  -BC      Tib tuberosity 47.6 cm  -BC      Mid patella 55.3 cm  -BC      Distal thigh 66 cm  -BC      Other 1 33.6 cm  -BC      Other 2 42 cm  -BC      RLE Quick Girth Total 353.3  -BC              Trunk Circumferential (cm)    Measurement Location 1 Navel  -BC      Trunk 1 132 cm  -BC      Trunk Circumferential Total 132 cm  -BC              Manual Lymphatic Drainage    Manual Lymphatic Drainage extremity treatment;opened regional lymph nodes  -BC      Opened Regional Lymph Nodes inguinal  -BC      Extremity Treatment MLD to full limb  -BC      MLD to Full Limb BLE's  -BC      Manual Lymphatic Drainage Comments Vibration as tolerated.  -BC      Manual Therapy MLD to BLE's, Abd., inguinals.  -BC              Compression/Skin Care    Compression/Skin  Care skin care;wrapping location;bandaging  -BC      Skin Care moisturizing lotion applied  -BC      Wrapping Location lower extremity  -BC      Wrapping Location LE bilateral:  ankles to knees  -BC      Wrapping Comments Pt. request ankle to kness, so they could wear their shoes.  -BC      Bandage Layers short-stretch bandages (comment size/quantity);cotton elastic stocking- single layer (comment size);soft foam- 1/4 inch  -BC      Bandaging Technique moderate compression;circumferential/spiral  -BC      Compression/Skin Care Comments Compression pump x Abd., BLE's  -BC            User Key  (r) = Recorded By, (t) = Taken By, (c) = Cosigned By    Initials Name Provider Type    BC Nicolasa Reid OT Occupational Therapist                         OT Assessment/Plan     Row Name 06/02/22 1538          OT Assessment    Functional Limitations Decreased safety during functional activities;Limitations in functional capacity and performance;Performance in leisure activities;Impaired gait;Performance in self-care ADL;Limitation in home management  -BC     Impairments Balance;Coordination;Edema;Endurance;Gait;Impaired flexibility;Impaired lymphatic circulation;Muscle strength;Pain;Range of motion;Sensation  -BC     Assessment Comments Pt. positioned in supported recline for manual lymph drainage, compression pump, skin care and multi layered bandaging of BLE's.  -BC     Please refer to paper survey for additional self-reported information No  -BC     OT Diagnosis Lymphedema  -BC     OT Rehab Potential Good  -BC     Patient/caregiver participated in establishment of treatment plan and goals Yes  -BC     Patient would benefit from skilled therapy intervention Yes  -BC            OT Plan    OT Frequency 2x/week  -BC     Predicted Duration of Therapy Intervention (OT) 60 days  -BC     Planned CPT's? OT EVAL MOD COMPLEXITY: 80252;OT RE-EVAL: 58689;OT THER ACT EA 15 MIN: 55744QY;OT THER PROC EA 15 MIN: 01937JS;OT SELF  CARE/MGMT/TRAIN 15 MIN: 30380;OT MANUAL THERAPY EA 15 MIN: 84754;OT VASOPNEUMATIC DEVICE: 62814  lymphedema management  -BC     Planned Therapy Interventions (Optional Details) home exercise program;patient/family education;manual therapy techniques  -BC     OT Plan Comments Pt. demo good progress w/POC achieving 2 STG's and 1 LTG, pt. has reported completing daily HEP, following a lower sodium/lower carb diet, elevating her legs when sitting, wearing her reducation kits daily (20-30mmHg) and is attempting to wear compression undergarments to assist w/truncal congestion.  However, pt. reports that is is hindering her ability to void without accidents.  Pt. would benefit from a home sequential pump (flexitouch plus) to address ongoing s/s of lymphedema in trunk and BLE's. Pt.s measurements have  increased despite all of the above efforts.  -BC           User Key  (r) = Recorded By, (t) = Taken By, (c) = Cosigned By    Initials Name Provider Type    BC Nicolasa Reid OT Occupational Therapist                       OT Goals     Row Name 06/02/22 1500          OT Short Term Goals    STG Date to Achieve 06/02/22  -BC     STG 1 Pt. will be familiar w/ precautions, skin care, and self management of lymphedema.  -BC     STG 1 Progress Met  -BC     STG 2 Pt. will decrease pitting edema to3to4/4 to reduce risk of infection.  -BC     STG 2 Progress Met  -BC     STG 3 Pt. will reduce overall girth by 5cm to decrease risk of infection.  -BC     STG 3 Progress Not Met  -BC     STG 4 Pt. will be familiar w/ HEP to assist w/ improved lymphatic flow.  -BC     STG 4 Progress Ongoing  -BC            Long Term Goals    LTG Date to Achieve 07/25/22  -BC     LTG 1 Pt./ will be independent w/ short stretch compression bandaging for girth reduction.  -BC     LTG 1 Progress Goal Revised  -BC     LTG 1 Progress Comments Pt. will self direct caregiver in bandaging techniques.  -BC     LTG 2 Pt. will decrease pitting edema to 3/4 to  reduce risk of infection.  -BC     LTG 2 Progress Ongoing  -BC     LTG 3 Pt. will reduce overall girth by 10cm to decrease risk of infection.  -BC     LTG 3 Progress Not Met  -BC     LTG 4 Pt./ will be independent w/ donning/doffing compression garment.  -BC     LTG 4 Progress Met  -BC     LTG 5 Pt. will be independent w/ lymphedema management and HEP.  -BC     LTG 5 Progress Progressing  -BC            Time Calculation    OT Goal Re-Cert Due Date 07/02/22  -BC           User Key  (r) = Recorded By, (t) = Taken By, (c) = Cosigned By    Initials Name Provider Type    BC Nicolasa Reid OT Occupational Therapist                Therapy Education  Given: HEP, Edema management, Symptoms/condition management, Bandaging/dressing change  Program: Reinforced  How Provided: Verbal, Demonstration  Provided to: Patient  Level of Understanding: Verbalized                Time Calculation:   OT Start Time: 1250  OT Stop Time: 1400  OT Time Calculation (min): 70 min  OT Non-Billable Time (min): 25 min     Therapy Charges for Today     Code Description Service Date Service Provider Modifiers Qty    28197933058 HC OT MANUAL THERAPY EA 15 MIN 6/2/2022 Nicolasa Reid OT GO, KX 2    38074062053 HC OT LYMPHEDEMA MANAGEMENT-15 MIN 6/2/2022 Nicolasa Reid OT KX 1    16244106677 HC OT SELF CARE/MGMT/TRAIN EA 15 MIN 6/2/2022 Nicolasa Reid OT GO, KX 1    57131423252 HC OT VASOPNEUMAT DEVIC 1 OR MORE AREAS 6/2/2022 Nicolasa Reid OT GO KX 1                      Nicolasa Reid OT  6/2/2022

## 2022-06-06 ENCOUNTER — HOSPITAL ENCOUNTER (OUTPATIENT)
Dept: OCCUPATIONAL THERAPY | Facility: HOSPITAL | Age: 67
Setting detail: THERAPIES SERIES
Discharge: HOME | End: 2022-06-06

## 2022-06-06 DIAGNOSIS — Q82.0 HEREDITARY LYMPHEDEMA: ICD-10-CM

## 2022-06-06 DIAGNOSIS — I89.0 LYMPHEDEMA OF BOTH LOWER EXTREMITIES: Primary | ICD-10-CM

## 2022-06-06 PROCEDURE — 97139 UNLISTED THERAPEUTIC PX: CPT

## 2022-06-06 PROCEDURE — 97535 SELF CARE MNGMENT TRAINING: CPT

## 2022-06-06 PROCEDURE — 97140 MANUAL THERAPY 1/> REGIONS: CPT

## 2022-06-06 PROCEDURE — 97016 VASOPNEUMATIC DEVICE THERAPY: CPT

## 2022-06-06 NOTE — THERAPY TREATMENT NOTE
Outpatient Occupational Therapy Lymphedema Treatment Note  TEVIN Gilliam     Patient Name: Neelam Gibbs  : 1955  MRN: [id number]  Today's Date: 2022      Visit Date: 2022    Patient Active Problem List   Diagnosis   • Glucocorticoid deficiency (HCC)        Past Medical History:   Diagnosis Date   • Acute renal failure (ARF) (HCC)     after allergic reaction to Bactrim   • Adrenal cortical hypofunction (HCC)    • Anxiety and depression    • Chronic kidney disease (CKD)    • DDD (degenerative disc disease), lumbosacral    • DJD (degenerative joint disease)    • Hyperlipidemia    • Hypothyroidism    • Peripheral neuropathy         Past Surgical History:   Procedure Laterality Date   • BACK SURGERY     • BREAST BIOPSY Left     neg   • EYE SURGERY Bilateral    • SHOULDER SURGERY Left    • TOTAL KNEE ARTHROPLASTY Left    • WRIST SURGERY Right          Visit Dx:      ICD-10-CM ICD-9-CM   1. Lymphedema of both lower extremities  I89.0 457.1   2. Hereditary lymphedema  Q82.0 757.0        Lymphedema     Row Name 22 1300             Subjective Pain    Able to rate subjective pain? yes  -BC      Pre-Treatment Pain Level 8  -BC      Subjective Pain Comment Back  -BC              Subjective Comments    Subjective Comments Pt. reports increased swelling in feet, and attributes it to potato chip consumption.  Pt.s back is very painful.  -BC              Lymphedema Assessment    Lymphedema Classification RLE:;LLE:;Trunk:;stage 2 (Spontaneously Irreversible);secondary  -BC      Lymphedema Precautions kidney disease  -BC              Lymphedema Edema Assessment    Ptting Edema Category By grade out of 4;By severity  -BC      Pitting Edema + 4/4;Severe  -BC      Stemmer Sign bilateral:;negative  -BC      [place] Hump bilateral:;negative  -BC              Skin Changes/Observations    Location/Assessment Lower Extremity  -BC      Lower Extremity Conditions bilateral:;hairless;shiny;clean  -BC      Lower  Extremity Color/Pigment bilateral:;red;purple;non-blanchable;hypopigmented;brawny;woody  -BC              Lymphedema Sensation    Lymphedema Sensation Reports RLE:;numbness;tingling  -BC      Lymphedema Sensation Tests light touch  -BC      Lymphedema Light Touch RLE:;absent sensation;LLE:;mild impairment  -BC              Lymphedema Pulses/Capillary Refill    Lymphedema Pulses/Capillary Refill capillary refill  -BC      Capillary Refill lower extremity capillary refill  -BC      Lower Extremity Capillary Refill left:;right:;less than 3 seconds  -BC              Lymphedema Measurements    Measurement Type(s) Quick Girth;Circumferential  -BC      Quick Girth Areas Lower extremities  -BC      Circumferential Areas Trunk  -BC              LLE Quick Girth (cm)    Met-heads 19.8 cm  -BC      Mid foot 21.8 cm  -BC      Smallest ankle 25.4 cm  -BC      Largest calf 48.5 cm  -BC      Tib tuberosity 47.5 cm  -BC      Mid patella 57.6 cm  -BC      Distal thigh 60.5 cm  -BC      Other 1 33.3 cm  -BC      Other 2 40 cm  -BC              RLE Quick Girth (cm)    Met-heads 19.5 cm  -BC      Mid foot 22 cm  -BC      Smallest ankle 24.5 cm  -BC      Largest calf 42 cm  -BC      Tib tuberosity 48 cm  -BC      Mid patella 59.6 cm  -BC      Distal thigh 67.2 cm  -BC      Other 1 26 cm  -BC      Other 2 41.8 cm  -BC      RLE Quick Girth Total 350.6  -BC              Trunk Circumferential (cm)    Measurement Location 1 Navel  -BC              Manual Lymphatic Drainage    Manual Lymphatic Drainage extremity treatment;opened regional lymph nodes  -BC      Opened Regional Lymph Nodes inguinal  -BC      Extremity Treatment MLD to full limb  -BC      MLD to Full Limb BLE's  -BC      Manual Lymphatic Drainage Comments Vibration as tolerated.  -BC      Manual Therapy MLD to BLE's, Abd., inguinals.  -BC              Compression/Skin Care    Compression/Skin Care skin care;wrapping location;bandaging  -BC      Skin Care moisturizing lotion  applied  -BC      Wrapping Location lower extremity  -BC      Wrapping Location LE bilateral:  ankles to knees  -BC      Bandage Layers short-stretch bandages (comment size/quantity);cotton elastic stocking- single layer (comment size);soft foam- 1/4 inch  -BC      Bandaging Technique moderate compression;circumferential/spiral  -BC      Compression/Skin Care Comments Compression pump x Abd., BLE's.  -BC            User Key  (r) = Recorded By, (t) = Taken By, (c) = Cosigned By    Initials Name Provider Type    Nicolasa Rizo OT Occupational Therapist                         OT Assessment/Plan     Row Name 06/06/22 1545          OT Assessment    Assessment Comments Pt. positioned in supported recline for manual lymph drainage, compression pump, skin care and multi layered bandaging of BLE's ankles to below knees.  -BC           User Key  (r) = Recorded By, (t) = Taken By, (c) = Cosigned By    Initials Name Provider Type    Nicolasa Rizo OT Occupational Therapist                          Therapy Education  Given: HEP  Program: Reinforced  How Provided: Verbal  Provided to: Patient  Level of Understanding: Verbalized                Time Calculation:   OT Start Time: 1245  OT Stop Time: 1400  OT Time Calculation (min): 75 min  OT Non-Billable Time (min): 20 min     Therapy Charges for Today     Code Description Service Date Service Provider Modifiers Qty    13900982580 HC OT LYMPHEDEMA MANAGEMENT-15 MIN 6/6/2022 Nicolasa Reid OT KX 1    87640428627 HC OT MANUAL THERAPY EA 15 MIN 6/6/2022 Nicolasa Reid OT GO, KX 2    47732937616 HC OT SELF CARE/MGMT/TRAIN EA 15 MIN 6/6/2022 Nicolasa Reid OT GO, KX 1    88384269720 HC OT VASOPNEUMAT DEVIC 1 OR MORE AREAS 6/6/2022 Nicolasa Reid OT GO, KX 1                      Nicolasa Reid OT  6/6/2022

## 2022-06-09 ENCOUNTER — APPOINTMENT (OUTPATIENT)
Dept: OCCUPATIONAL THERAPY | Facility: HOSPITAL | Age: 67
End: 2022-06-09

## 2022-06-13 ENCOUNTER — HOSPITAL ENCOUNTER (OUTPATIENT)
Dept: OCCUPATIONAL THERAPY | Facility: HOSPITAL | Age: 67
Setting detail: THERAPIES SERIES
Discharge: HOME | End: 2022-06-13

## 2022-06-13 DIAGNOSIS — I89.0 LYMPHEDEMA OF BOTH LOWER EXTREMITIES: Primary | ICD-10-CM

## 2022-06-13 DIAGNOSIS — Q82.0 HEREDITARY LYMPHEDEMA: ICD-10-CM

## 2022-06-13 PROCEDURE — 97140 MANUAL THERAPY 1/> REGIONS: CPT

## 2022-06-13 PROCEDURE — 97139 UNLISTED THERAPEUTIC PX: CPT

## 2022-06-13 NOTE — THERAPY TREATMENT NOTE
Outpatient Occupational Therapy Lymphedema Treatment Note  TEVIN Gilliam     Patient Name: Neelam Gibbs  : 1955  MRN: [id number]  Today's Date: 2022      Visit Date: 2022    Patient Active Problem List   Diagnosis   • Glucocorticoid deficiency (HCC)        Past Medical History:   Diagnosis Date   • Acute renal failure (ARF) (HCC)     after allergic reaction to Bactrim   • Adrenal cortical hypofunction (HCC)    • Anxiety and depression    • Chronic kidney disease (CKD)    • DDD (degenerative disc disease), lumbosacral    • DJD (degenerative joint disease)    • Hyperlipidemia    • Hypothyroidism    • Peripheral neuropathy         Past Surgical History:   Procedure Laterality Date   • BACK SURGERY     • BREAST BIOPSY Left     neg   • EYE SURGERY Bilateral    • SHOULDER SURGERY Left    • TOTAL KNEE ARTHROPLASTY Left    • WRIST SURGERY Right          Visit Dx:      ICD-10-CM ICD-9-CM   1. Lymphedema of both lower extremities  I89.0 457.1   2. Hereditary lymphedema  Q82.0 757.0        Lymphedema     Row Name 22 1300             Subjective Pain    Able to rate subjective pain? yes  -LA      Pre-Treatment Pain Level 8  -LA      Subjective Pain Comment Back  -LA              Subjective Comments    Subjective Comments Patient reports pain in back this date.  -LA              Lymphedema Assessment    Lymphedema Classification RLE:;LLE:;Trunk:;stage 2 (Spontaneously Irreversible);secondary  -LA      Lymphedema Precautions kidney disease  -LA              Lymphedema Edema Assessment    Ptting Edema Category By grade out of 4;By severity  -LA      Pitting Edema + 4/4;Severe  -LA      Stemmer Sign bilateral:;negative  -LA      [place] Hump bilateral:;negative  -LA              Skin Changes/Observations    Location/Assessment Lower Extremity  -LA      Lower Extremity Conditions bilateral:;hairless;shiny;clean  -LA      Lower Extremity Color/Pigment  bilateral:;red;purple;non-blanchable;hypopigmented;brawny;woody  -LA              Lymphedema Sensation    Lymphedema Sensation Reports RLE:;numbness;tingling  -LA      Lymphedema Sensation Tests light touch  -LA      Lymphedema Light Touch RLE:;absent sensation;LLE:;mild impairment  -LA              Lymphedema Pulses/Capillary Refill    Lymphedema Pulses/Capillary Refill capillary refill  -LA      Capillary Refill lower extremity capillary refill  -LA      Lower Extremity Capillary Refill left:;right:;less than 3 seconds  -LA              Lymphedema Measurements    Measurement Type(s) Quick Girth;Circumferential  -LA      Quick Girth Areas Lower extremities  -LA      Circumferential Areas Trunk  -LA              LLE Quick Girth (cm)    Met-heads 19.3 cm  -LA      Mid foot 21.5 cm  -LA      Smallest ankle 26 cm  -LA      Largest calf 48.5 cm  -LA      Tib tuberosity 48.6 cm  -LA      Mid patella 56.1 cm  -LA      Distal thigh 64.5 cm  -LA      Other 1 33.5 cm  -LA      Other 2 40 cm  -LA              RLE Quick Girth (cm)    Met-heads 18.5 cm  -LA      Mid foot 21 cm  -LA      Smallest ankle 26.1 cm  -LA      Largest calf 42.1 cm  -LA      Tib tuberosity 52.2 cm  -LA      Mid patella 54.2 cm  -LA      Distal thigh 66.5 cm  -LA      Other 1 35.5 cm  -LA      Other 2 43.7 cm  -LA      RLE Quick Girth Total 359.8  -LA              Trunk Circumferential (cm)    Measurement Location 1 Navel  -LA              Manual Lymphatic Drainage    Manual Lymphatic Drainage extremity treatment;opened regional lymph nodes  -LA      Opened Regional Lymph Nodes inguinal  -LA      Extremity Treatment MLD to full limb  -LA      MLD to Full Limb BLE's  -LA      Manual Lymphatic Drainage Comments Vibration to RLE  -LA      Manual Therapy MLD to BLE's, Abd, inguinals  -LA              Compression/Skin Care    Compression/Skin Care skin care;wrapping location;bandaging  -LA      Skin Care moisturizing lotion applied  -LA      Wrapping Location  lower extremity  -LA      Wrapping Location LE bilateral:  ankles to knees  -LA      Bandage Layers short-stretch bandages (comment size/quantity);cotton elastic stocking- single layer (comment size);soft foam- 1/4 inch  -LA      Bandaging Technique moderate compression;circumferential/spiral  -LA      Compression/Skin Care Comments Compression Pump x Abd., BLE  -LA            User Key  (r) = Recorded By, (t) = Taken By, (c) = Cosigned By    Initials Name Provider Type    Maranda Méndez OT Occupational Therapist                                      Therapy Education  Given: HEP, Bandaging/dressing change, Symptoms/condition management  Program: Reinforced  How Provided: Verbal, Demonstration  Provided to: Patient  Level of Understanding: Verbalized                Time Calculation:   OT Start Time: 1300  OT Stop Time: 1400  OT Time Calculation (min): 60 min  Total Timed Code Minutes- OT: 60 minute(s)     Therapy Charges for Today     Code Description Service Date Service Provider Modifiers Qty    99725391597  OT MANUAL THERAPY EA 15 MIN 6/13/2022 Maranda Lira OT GO, KX 3    [id number]  OT LYMPHEDEMA MANAGEMENT-15 MIN 6/13/2022 Maranda Lira OT KX 1                      Maranda Lira OT  6/13/2022

## 2022-06-16 ENCOUNTER — HOSPITAL ENCOUNTER (OUTPATIENT)
Dept: OCCUPATIONAL THERAPY | Facility: HOSPITAL | Age: 67
Setting detail: THERAPIES SERIES
Discharge: HOME | End: 2022-06-16

## 2022-06-16 DIAGNOSIS — Q82.0 HEREDITARY LYMPHEDEMA: ICD-10-CM

## 2022-06-16 DIAGNOSIS — I89.0 LYMPHEDEMA OF BOTH LOWER EXTREMITIES: Primary | ICD-10-CM

## 2022-06-16 PROCEDURE — 97140 MANUAL THERAPY 1/> REGIONS: CPT

## 2022-06-16 NOTE — THERAPY TREATMENT NOTE
"Outpatient Occupational Therapy Lymphedema Treatment Note  TEVIN Gilliam     Patient Name: Neelam Gibbs  : 1955  MRN: [id number]  Today's Date: 2022      Visit Date: 2022    Patient Active Problem List   Diagnosis   • Glucocorticoid deficiency (HCC)        Past Medical History:   Diagnosis Date   • Acute renal failure (ARF) (HCC)     after allergic reaction to Bactrim   • Adrenal cortical hypofunction (HCC)    • Anxiety and depression    • Chronic kidney disease (CKD)    • DDD (degenerative disc disease), lumbosacral    • DJD (degenerative joint disease)    • Hyperlipidemia    • Hypothyroidism    • Peripheral neuropathy         Past Surgical History:   Procedure Laterality Date   • BACK SURGERY     • BREAST BIOPSY Left     neg   • EYE SURGERY Bilateral    • SHOULDER SURGERY Left    • TOTAL KNEE ARTHROPLASTY Left    • WRIST SURGERY Right          Visit Dx:      ICD-10-CM ICD-9-CM   1. Lymphedema of both lower extremities  I89.0 457.1   2. Hereditary lymphedema  Q82.0 757.0        Lymphedema     Row Name 22 1300             Subjective Pain    Able to rate subjective pain? yes  -AB      Pre-Treatment Pain Level 9  -AB      Subjective Pain Comment back/legs - \"aching\"  -AB              Subjective Comments    Subjective Comments Pt. presents for tx w/ continued complaints of back and leg pain. Pts. ambulation appears impaired d/t the pain.  -AB              Lymphedema Assessment    Lymphedema Classification RLE:;LLE:;Trunk:;stage 2 (Spontaneously Irreversible);secondary  -AB      Lymphedema Precautions kidney disease  -AB              Lymphedema Edema Assessment    Ptting Edema Category By grade out of 4;By severity  -AB      Pitting Edema Moderate;+ 3/4;+ 4/4  -AB      Stemmer Sign bilateral:;negative  -AB      [place] Hump bilateral:;negative  -AB              Skin Changes/Observations    Location/Assessment Lower Extremity  -AB      Lower Extremity Conditions " bilateral:;hairless;shiny;clean  -AB      Lower Extremity Color/Pigment bilateral:;purple;non-blanchable;hypopigmented;brawny;woody  -AB              Lymphedema Sensation    Lymphedema Sensation Reports RLE:;numbness;tingling  -AB      Lymphedema Sensation Tests light touch  -AB      Lymphedema Light Touch RLE:;absent sensation;LLE:;mild impairment  -AB              Lymphedema Pulses/Capillary Refill    Lymphedema Pulses/Capillary Refill capillary refill  -AB      Capillary Refill lower extremity capillary refill  -AB      Lower Extremity Capillary Refill left:;right:;less than 3 seconds  -AB              Lymphedema Measurements    Measurement Type(s) Quick Girth;Circumferential  -AB      Quick Girth Areas Lower extremities  -AB      Circumferential Areas Trunk  -AB              Trunk Circumferential (cm)    Measurement Location 1 Navel  -AB              Manual Lymphatic Drainage    Manual Lymphatic Drainage extremity treatment;opened regional lymph nodes  -AB      Opened Regional Lymph Nodes inguinal  -AB      Extremity Treatment MLD to full limb  -AB      MLD to Full Limb BLE's  -AB      Manual Lymphatic Drainage Comments vibration to RLE  -AB      Manual Therapy MLD to BLE's, abdomen, inguinals  -AB              Compression/Skin Care    Compression/Skin Care skin care;wrapping location;bandaging  -AB      Skin Care moisturizing lotion applied  -AB      Wrapping Location --  -AB      Wrapping Location LE --  -AB      Bandage Layers --  -AB      Bandaging Comments no bandages this date d/t difficulty w/ ambulation  -AB      Bandaging Technique --  -AB      Compression/Skin Care Comments compression pump to abdomen and BLE's  -AB            User Key  (r) = Recorded By, (t) = Taken By, (c) = Cosigned By    Initials Name Provider Type    AB Felisa Da Silva, OT Occupational Therapist                                      Therapy Education  Given: HEP, Edema management, Symptoms/condition management,  Bandaging/dressing change  Program: Reinforced, Progressed  How Provided: Demonstration, Verbal  Provided to: Patient  Level of Understanding: Verbalized                Time Calculation:   OT Start Time: 1300  OT Stop Time: 1400  OT Time Calculation (min): 60 min  OT Non-Billable Time (min): 25 min  Total Timed Code Minutes- OT: 60 minute(s)     Therapy Charges for Today     Code Description Service Date Service Provider Modifiers Qty    08598950290 HC OT MANUAL THERAPY EA 15 MIN 6/16/2022 Felisa Da Silva OT GO, KX 4                      Felisa Da Silva OT  6/16/2022

## 2022-06-20 ENCOUNTER — HOSPITAL ENCOUNTER (OUTPATIENT)
Dept: OCCUPATIONAL THERAPY | Facility: HOSPITAL | Age: 67
Setting detail: THERAPIES SERIES
Discharge: HOME | End: 2022-06-20

## 2022-06-20 DIAGNOSIS — Q82.0 HEREDITARY LYMPHEDEMA: ICD-10-CM

## 2022-06-20 DIAGNOSIS — I89.0 LYMPHEDEMA OF BOTH LOWER EXTREMITIES: Primary | ICD-10-CM

## 2022-06-20 PROCEDURE — 97140 MANUAL THERAPY 1/> REGIONS: CPT

## 2022-06-20 PROCEDURE — 97139 UNLISTED THERAPEUTIC PX: CPT

## 2022-06-20 PROCEDURE — 97535 SELF CARE MNGMENT TRAINING: CPT

## 2022-06-20 PROCEDURE — 97016 VASOPNEUMATIC DEVICE THERAPY: CPT

## 2022-06-20 NOTE — THERAPY TREATMENT NOTE
Outpatient Occupational Therapy Lymphedema Treatment Note  TEVIN Gilliam     Patient Name: Neelam Gibbs  : 1955  MRN: [id number]  Today's Date: 2022      Visit Date: 2022    Patient Active Problem List   Diagnosis   • Glucocorticoid deficiency (HCC)        Past Medical History:   Diagnosis Date   • Acute renal failure (ARF) (HCC)     after allergic reaction to Bactrim   • Adrenal cortical hypofunction (HCC)    • Anxiety and depression    • Chronic kidney disease (CKD)    • DDD (degenerative disc disease), lumbosacral    • DJD (degenerative joint disease)    • Hyperlipidemia    • Hypothyroidism    • Peripheral neuropathy         Past Surgical History:   Procedure Laterality Date   • BACK SURGERY     • BREAST BIOPSY Left     neg   • EYE SURGERY Bilateral    • SHOULDER SURGERY Left    • TOTAL KNEE ARTHROPLASTY Left    • WRIST SURGERY Right          Visit Dx:      ICD-10-CM ICD-9-CM   1. Lymphedema of both lower extremities  I89.0 457.1   2. Hereditary lymphedema  Q82.0 757.0        Lymphedema     Row Name 22 1300             Subjective Pain    Able to rate subjective pain? yes  -HB      Pre-Treatment Pain Level 3  -HB      Subjective Pain Comment --  back  -HB              Subjective Comments    Subjective Comments Patient presents to lymphedema tx with no new complaints  -HB              Lymphedema Assessment    Lymphedema Classification RLE:;LLE:;Trunk:;stage 2 (Spontaneously Irreversible);secondary  -HB      Lymphedema Precautions kidney disease  -HB              Lymphedema Edema Assessment    Ptting Edema Category By grade out of 4;By severity  -HB      Pitting Edema Moderate;+ 3/4;+ 4/4  -HB      Stemmer Sign bilateral:;negative  -HB      [place] Hump bilateral:;negative  -HB              Skin Changes/Observations    Location/Assessment Lower Extremity  -HB      Lower Extremity Conditions bilateral:;hairless;shiny;clean  -HB      Lower Extremity Color/Pigment  bilateral:;purple;non-blanchable;hypopigmented;brawny;woody  -HB              Lymphedema Sensation    Lymphedema Sensation Reports RLE:;numbness;tingling  -HB      Lymphedema Sensation Tests light touch  -HB      Lymphedema Light Touch RLE:;absent sensation;LLE:;mild impairment  -HB              Lymphedema Pulses/Capillary Refill    Lymphedema Pulses/Capillary Refill capillary refill  -HB      Capillary Refill lower extremity capillary refill  -HB      Lower Extremity Capillary Refill left:;right:;less than 3 seconds  -HB              Lymphedema Measurements    Measurement Type(s) Quick Girth;Circumferential  -HB      Quick Girth Areas Lower extremities  -HB      Circumferential Areas Trunk  -HB              LLE Quick Girth (cm)    Met-heads 19.5 cm  -HB      Mid foot 21.4 cm  -HB      Smallest ankle 26.8 cm  -HB      Largest calf 48 cm  -HB      Tib tuberosity 48.5 cm  -HB      Mid patella 56 cm  -HB      Distal thigh 66.2 cm  -HB      Other 1 33.6 cm  -HB      Other 2 39.5 cm  -HB              RLE Quick Girth (cm)    Met-heads 18.9 cm  -HB      Mid foot 20.2 cm  -HB      Smallest ankle 26.5 cm  -HB      Largest calf 42.8 cm  -HB      Tib tuberosity 49 cm  -HB      Mid patella 53.9 cm  -HB      Distal thigh 64.3 cm  -HB      Other 1 35.2 cm  -HB      Other 2 42.1 cm  -HB      RLE Quick Girth Total 352.9  -HB              Trunk Circumferential (cm)    Trunk 1 135.2 cm  -HB      Trunk Circumferential Total 135.2 cm  -HB              Manual Lymphatic Drainage    Manual Lymphatic Drainage extremity treatment;opened regional lymph nodes  -HB      Opened Regional Lymph Nodes inguinal  -HB      Extremity Treatment MLD to full limb  -HB      MLD to Full Limb BLE's  -HB      Manual Lymphatic Drainage Comments vibration to BLE as tolerated  -HB      Manual Therapy MLD to BLE's, abdomen, and inguinals  -HB              Compression/Skin Care    Compression/Skin Care skin care  -HB      Skin Care moisturizing lotion applied   -HB      Bandaging Comments pt not wrapped this date per pt request  -HB      Compression/Skin Care Comments compression pump to BLE and abdomen  -HB            User Key  (r) = Recorded By, (t) = Taken By, (c) = Cosigned By    Initials Name Provider Type    Aida Sun OT Occupational Therapist                         OT Assessment/Plan     Row Name 06/20/22 1557          OT Assessment    Assessment Comments Pt has gotten basic pump for lymphedema management  -HB           User Key  (r) = Recorded By, (t) = Taken By, (c) = Cosigned By    Initials Name Provider Type    Aida Sun OT Occupational Therapist                          Therapy Education  Given: HEP, Symptoms/condition management, Edema management  Program: Reinforced  How Provided: Verbal  Level of Understanding: Teach back education performed                Time Calculation:   OT Start Time: 1300  OT Stop Time: 1410  OT Time Calculation (min): 70 min  OT Non-Billable Time (min): 20 min  Total Timed Code Minutes- OT: 70 minute(s)     Therapy Charges for Today     Code Description Service Date Service Provider Modifiers Qty    20149940993  OT SELF CARE/MGMT/TRAIN EA 15 MIN 6/20/2022 Aida Marti OT GO 1    [id number]  OT LYMPHEDEMA MANAGEMENT-15 MIN 6/20/2022 Aida Marti OT  1    [id number]  OT MANUAL THERAPY EA 15 MIN 6/20/2022 Aida Marti OT GO 2    [id number]  OT VASOPNEUMAT DEVIC 1 OR MORE AREAS 6/20/2022 Aida Marti OT GO 1                      Aida Marti OT  6/20/2022

## 2022-06-23 ENCOUNTER — HOSPITAL ENCOUNTER (OUTPATIENT)
Dept: OCCUPATIONAL THERAPY | Facility: HOSPITAL | Age: 67
Setting detail: THERAPIES SERIES
Discharge: HOME | End: 2022-06-23

## 2022-06-23 DIAGNOSIS — I89.0 LYMPHEDEMA OF BOTH LOWER EXTREMITIES: Primary | ICD-10-CM

## 2022-06-23 DIAGNOSIS — Q82.0 HEREDITARY LYMPHEDEMA: ICD-10-CM

## 2022-06-23 PROCEDURE — 97139 UNLISTED THERAPEUTIC PX: CPT

## 2022-06-23 PROCEDURE — 97140 MANUAL THERAPY 1/> REGIONS: CPT

## 2022-06-23 NOTE — THERAPY TREATMENT NOTE
Outpatient Occupational Therapy Lymphedema Treatment Note  TEVIN Gilliam     Patient Name: Neelam Gibbs  : 1955  MRN: [id number]  Today's Date: 2022      Visit Date: 2022    Patient Active Problem List   Diagnosis   • Glucocorticoid deficiency (HCC)        Past Medical History:   Diagnosis Date   • Acute renal failure (ARF) (HCC)     after allergic reaction to Bactrim   • Adrenal cortical hypofunction (HCC)    • Anxiety and depression    • Chronic kidney disease (CKD)    • DDD (degenerative disc disease), lumbosacral    • DJD (degenerative joint disease)    • Hyperlipidemia    • Hypothyroidism    • Peripheral neuropathy         Past Surgical History:   Procedure Laterality Date   • BACK SURGERY     • BREAST BIOPSY Left     neg   • EYE SURGERY Bilateral    • SHOULDER SURGERY Left    • TOTAL KNEE ARTHROPLASTY Left    • WRIST SURGERY Right          Visit Dx:      ICD-10-CM ICD-9-CM   1. Lymphedema of both lower extremities  I89.0 457.1   2. Hereditary lymphedema  Q82.0 757.0        Lymphedema     Row Name 22 1300             Subjective Pain    Able to rate subjective pain? yes  -AB      Pre-Treatment Pain Level 8  -AB      Subjective Pain Comment BLE's  -AB              Subjective Comments    Subjective Comments Pt. requests no bandages this date d/t increased pain in BLE's.  -AB              Lymphedema Assessment    Lymphedema Classification RLE:;LLE:;Trunk:;stage 2 (Spontaneously Irreversible);secondary  -AB      Lymphedema Precautions kidney disease  -AB              Lymphedema Edema Assessment    Ptting Edema Category By grade out of 4;By severity  -AB      Pitting Edema Moderate;+ 3/4;+ 4/4  -AB      Stemmer Sign bilateral:;negative  -AB      [place] Hump bilateral:;negative  -AB              Skin Changes/Observations    Location/Assessment Lower Extremity  -AB      Lower Extremity Conditions bilateral:;hairless;shiny;clean  -AB      Lower Extremity Color/Pigment  bilateral:;purple;non-blanchable;hypopigmented;brawny;woody  -AB              Lymphedema Sensation    Lymphedema Sensation Reports RLE:;numbness;tingling  -AB      Lymphedema Sensation Tests light touch  -AB      Lymphedema Light Touch RLE:;absent sensation;LLE:;mild impairment  -AB              Lymphedema Pulses/Capillary Refill    Lymphedema Pulses/Capillary Refill capillary refill  -AB      Capillary Refill lower extremity capillary refill  -AB      Lower Extremity Capillary Refill left:;right:;less than 3 seconds  -AB              Lymphedema Measurements    Measurement Type(s) Quick Girth;Circumferential  -AB      Quick Girth Areas Lower extremities  -AB      Circumferential Areas Trunk  -AB              LLE Quick Girth (cm)    Met-heads 19 cm  -AB      Mid foot 20 cm  -AB      Smallest ankle 23.5 cm  -AB      Largest calf 43.1 cm  -AB      Tib tuberosity 47.8 cm  -AB      Mid patella 52.2 cm  -AB      Distal thigh 61.3 cm  -AB      Other 1 28.7 cm  -AB      Other 2 36.9 cm  -AB              RLE Quick Girth (cm)    Met-heads 18.8 cm  -AB      Mid foot 20 cm  -AB      Smallest ankle 22.7 cm  -AB      Largest calf 43 cm  -AB      Tib tuberosity 48.3 cm  -AB      Mid patella 54.9 cm  -AB      Distal thigh 63.8 cm  -AB      Other 1 29.6 cm  -AB      Other 2 38.9 cm  -AB      RLE Quick Girth Total 340  -AB              Manual Lymphatic Drainage    Manual Lymphatic Drainage extremity treatment;opened regional lymph nodes  -AB      Opened Regional Lymph Nodes inguinal  -AB      Extremity Treatment MLD to full limb  -AB      MLD to Full Limb BLE's  -AB      Manual Lymphatic Drainage Comments vibration to BLE's  -AB      Manual Therapy MLD to BLE's, abdomen  -AB              Compression/Skin Care    Compression/Skin Care skin care  -AB      Skin Care moisturizing lotion applied  -AB      Bandaging Comments no wraps per pt. request  -AB      Compression/Skin Care Comments compression pump to abdomen and BLE's  -AB             User Key  (r) = Recorded By, (t) = Taken By, (c) = Cosigned By    Initials Name Provider Type    AB Felisa Da Silva, OT Occupational Therapist                                      Therapy Education  Given: HEP, Edema management, Symptoms/condition management, Bandaging/dressing change  Program: Reinforced  How Provided: Verbal, Demonstration  Provided to: Patient  Level of Understanding: Verbalized                Time Calculation:   OT Start Time: 1300  OT Stop Time: 1415  OT Time Calculation (min): 75 min  OT Non-Billable Time (min): 25 min  Total Timed Code Minutes- OT: 75 minute(s)     Therapy Charges for Today     Code Description Service Date Service Provider Modifiers Qty    [id number]  OT MANUAL THERAPY EA 15 MIN 6/23/2022 Felisa Da Silva OT GO, KX 4    [id number]  OT LYMPHEDEMA MANAGEMENT-15 MIN 6/23/2022 Felisa Da Silva OT KX 1                      Felisa Da Silva OT  6/23/2022

## 2022-06-27 ENCOUNTER — HOSPITAL ENCOUNTER (OUTPATIENT)
Dept: OCCUPATIONAL THERAPY | Facility: HOSPITAL | Age: 67
Setting detail: THERAPIES SERIES
Discharge: HOME | End: 2022-06-27

## 2022-06-27 DIAGNOSIS — Q82.0 HEREDITARY LYMPHEDEMA: ICD-10-CM

## 2022-06-27 DIAGNOSIS — I89.0 LYMPHEDEMA OF BOTH LOWER EXTREMITIES: Primary | ICD-10-CM

## 2022-06-27 PROCEDURE — 97140 MANUAL THERAPY 1/> REGIONS: CPT

## 2022-06-27 PROCEDURE — 97139 UNLISTED THERAPEUTIC PX: CPT

## 2022-06-27 NOTE — THERAPY TREATMENT NOTE
"Outpatient Occupational Therapy Lymphedema Treatment Note  TEVIN Gilliam     Patient Name: Neelam Gibbs  : 1955  MRN: [id number]  Today's Date: 2022      Visit Date: 2022    Patient Active Problem List   Diagnosis   • Glucocorticoid deficiency (HCC)        Past Medical History:   Diagnosis Date   • Acute renal failure (ARF) (HCC)     after allergic reaction to Bactrim   • Adrenal cortical hypofunction (HCC)    • Anxiety and depression    • Chronic kidney disease (CKD)    • DDD (degenerative disc disease), lumbosacral    • DJD (degenerative joint disease)    • Hyperlipidemia    • Hypothyroidism    • Peripheral neuropathy         Past Surgical History:   Procedure Laterality Date   • BACK SURGERY     • BREAST BIOPSY Left     neg   • EYE SURGERY Bilateral    • SHOULDER SURGERY Left    • TOTAL KNEE ARTHROPLASTY Left    • WRIST SURGERY Right          Visit Dx:      ICD-10-CM ICD-9-CM   1. Lymphedema of both lower extremities  I89.0 457.1   2. Hereditary lymphedema  Q82.0 757.0        Lymphedema     Row Name 22 1300             Subjective Pain    Able to rate subjective pain? yes  -AB      Pre-Treatment Pain Level 8  7-8  -AB      Subjective Pain Comment BLE's  -AB              Subjective Comments    Subjective Comments Pt. reports that her basic pump \"squeezes\" her toes/feet to the point of being painful. She states that it is set to the lowest compression setting.  -AB              Lymphedema Assessment    Lymphedema Classification RLE:;LLE:;Trunk:;stage 2 (Spontaneously Irreversible);secondary  -AB      Lymphedema Precautions kidney disease  -AB              Lymphedema Edema Assessment    Ptting Edema Category By grade out of 4;By severity  -AB      Pitting Edema Mild;Moderate;+ 3/4;+ 2/4  -AB      Stemmer Sign bilateral:;negative  -AB      [place] Hump bilateral:;negative  -AB              Skin Changes/Observations    Location/Assessment Lower Extremity  -AB      Lower Extremity Conditions " bilateral:;hairless;shiny;clean  -AB      Lower Extremity Color/Pigment bilateral:;purple;non-blanchable;hypopigmented;brawny;woody  -AB              Lymphedema Sensation    Lymphedema Sensation Reports RLE:;numbness;tingling  -AB      Lymphedema Sensation Tests light touch  -AB      Lymphedema Light Touch RLE:;absent sensation;LLE:;mild impairment  -AB              Lymphedema Pulses/Capillary Refill    Lymphedema Pulses/Capillary Refill capillary refill  -AB      Capillary Refill lower extremity capillary refill  -AB      Lower Extremity Capillary Refill left:;right:;less than 3 seconds  -AB              Lymphedema Measurements    Measurement Type(s) Quick Girth;Circumferential  -AB      Quick Girth Areas Lower extremities  -AB      Circumferential Areas Trunk  -AB              LLE Quick Girth (cm)    Met-heads 18.8 cm  -AB      Mid foot 21.8 cm  -AB      Smallest ankle 26.6 cm  -AB      Largest calf 44 cm  -AB      Tib tuberosity 45 cm  -AB      Mid patella 52.1 cm  -AB      Other 1 30.2 cm  -AB      Other 2 37 cm  -AB              RLE Quick Girth (cm)    Met-heads 19.3 cm  -AB      Mid foot 20.2 cm  -AB      Smallest ankle 23.8 cm  -AB      Largest calf 43.5 cm  -AB      Tib tuberosity 48.2 cm  -AB      Mid patella 52.9 cm  -AB      Other 1 31.5 cm  -AB      Other 2 40.3 cm  -AB      RLE Quick Girth Total 279.7  -AB              Manual Lymphatic Drainage    Manual Lymphatic Drainage extremity treatment;opened regional lymph nodes  -AB      Opened Regional Lymph Nodes inguinal  -AB      Extremity Treatment MLD to full limb  -AB      MLD to Full Limb BLE's  -AB      Manual Lymphatic Drainage Comments vibration to RLE  -AB      Manual Therapy MLD to BLE's, abdomen, inguinals  -AB              Compression/Skin Care    Compression/Skin Care skin care  -AB      Skin Care moisturizing lotion applied  -AB      Wrapping Location lower extremity  -AB      Wrapping Location LE bilateral:  ankles to knees  -AB      Bandage  Layers short-stretch bandages (comment size/quantity);cotton elastic stocking- single layer (comment size);soft foam- 1/4 inch  -AB      Bandaging Technique circumferential/spiral;light compression;moderate compression  -AB      Compression/Skin Care Comments compression pump to abdomen and BLE's  -AB            User Key  (r) = Recorded By, (t) = Taken By, (c) = Cosigned By    Initials Name Provider Type    AB Felisa Da Silva, OT Occupational Therapist                                      Therapy Education  Given: HEP, Edema management, Symptoms/condition management, Bandaging/dressing change  Program: Reinforced  How Provided: Verbal, Demonstration  Provided to: Patient  Level of Understanding: Verbalized                Time Calculation:   OT Start Time: 1300  OT Stop Time: 1415  OT Time Calculation (min): 75 min  OT Non-Billable Time (min): 25 min  Total Timed Code Minutes- OT: 75 minute(s)     Therapy Charges for Today     Code Description Service Date Service Provider Modifiers Qty    [id number]  OT MANUAL THERAPY EA 15 MIN 6/27/2022 Felisa Da Silva OT GO, KX 4    [id number]  OT LYMPHEDEMA MANAGEMENT-15 MIN 6/27/2022 Felisa Da Silva OT KX 1                      Felisa Da Silva OT  6/27/2022

## 2022-07-01 ENCOUNTER — HOSPITAL ENCOUNTER (OUTPATIENT)
Dept: OCCUPATIONAL THERAPY | Facility: HOSPITAL | Age: 67
Setting detail: THERAPIES SERIES
Discharge: HOME | End: 2022-07-01

## 2022-07-01 DIAGNOSIS — Q82.0 HEREDITARY LYMPHEDEMA: ICD-10-CM

## 2022-07-01 DIAGNOSIS — I89.0 LYMPHEDEMA OF BOTH LOWER EXTREMITIES: Primary | ICD-10-CM

## 2022-07-01 PROCEDURE — 97016 VASOPNEUMATIC DEVICE THERAPY: CPT

## 2022-07-01 PROCEDURE — 97140 MANUAL THERAPY 1/> REGIONS: CPT

## 2022-07-01 PROCEDURE — 97139 UNLISTED THERAPEUTIC PX: CPT

## 2022-07-01 NOTE — THERAPY PROGRESS REPORT/RE-CERT
Outpatient Occupational Therapy Lymphedema Progress Note  TEVIN Gilliam     Patient Name: Neelam Gibbs  : 1955  MRN: [id number]  Today's Date: 2022      Visit Date: 2022    Patient Active Problem List   Diagnosis   • Glucocorticoid deficiency (HCC)        Past Medical History:   Diagnosis Date   • Acute renal failure (ARF) (HCC)     after allergic reaction to Bactrim   • Adrenal cortical hypofunction (HCC)    • Anxiety and depression    • Chronic kidney disease (CKD)    • DDD (degenerative disc disease), lumbosacral    • DJD (degenerative joint disease)    • Hyperlipidemia    • Hypothyroidism    • Peripheral neuropathy         Past Surgical History:   Procedure Laterality Date   • BACK SURGERY     • BREAST BIOPSY Left     neg   • EYE SURGERY Bilateral    • SHOULDER SURGERY Left    • TOTAL KNEE ARTHROPLASTY Left    • WRIST SURGERY Right          Visit Dx:      ICD-10-CM ICD-9-CM   1. Lymphedema of both lower extremities  I89.0 457.1   2. Hereditary lymphedema  Q82.0 757.0        Lymphedema     Row Name 22 1400             Subjective Pain    Able to rate subjective pain? yes  -BC      Pre-Treatment Pain Level 7  -BC      Post-Treatment Pain Level 3  -BC      Subjective Pain Comment BLE's  -BC              Subjective Comments    Subjective Comments Pt. having difficulty with basic pump, feels it squeezes her toes too tight and she has tried multiple times to adjust.  Pt. with discolored area on LLE just above her ankle fold that she attributes to the pump, area does appear to be from something that was too tight.  -BC              Lymphedema Assessment    Lymphedema Classification RLE:;LLE:;Trunk:;stage 2 (Spontaneously Irreversible);secondary  -BC      Lymphedema Precautions kidney disease  -BC              Lymphedema Edema Assessment    Ptting Edema Category By grade out of 4;By severity  -BC      Pitting Edema Mild;Moderate;+ 3/4;+ 2/4  -BC      Stemmer Sign bilateral:;negative  -BC       [place] Hump bilateral:;negative  -BC              Skin Changes/Observations    Location/Assessment Lower Extremity  -BC      Lower Extremity Conditions bilateral:;hairless;shiny;clean  -BC      Lower Extremity Color/Pigment bilateral:;purple;non-blanchable;hypopigmented;brawny;woody  -BC      Skin Observations Comment Bruised/reddened area on LLE.  -BC              Lymphedema Sensation    Lymphedema Sensation Reports RLE:;numbness;tingling  -BC      Lymphedema Sensation Tests light touch  -BC      Lymphedema Light Touch RLE:;absent sensation;LLE:;mild impairment  -BC              Lymphedema Pulses/Capillary Refill    Lymphedema Pulses/Capillary Refill capillary refill  -BC      Capillary Refill lower extremity capillary refill  -BC      Lower Extremity Capillary Refill left:;right:;less than 3 seconds  -BC              Lymphedema Measurements    Measurement Type(s) Quick Girth;Circumferential  -BC      Quick Girth Areas Lower extremities  -BC      Circumferential Areas Trunk  -BC              Manual Lymphatic Drainage    Manual Lymphatic Drainage extremity treatment;opened regional lymph nodes  -BC      Opened Regional Lymph Nodes inguinal  -BC      Extremity Treatment MLD to full limb  -BC      MLD to Full Limb BLE's  -BC      Manual Therapy MLD to BLE's, abd.  -BC              Compression/Skin Care    Compression/Skin Care skin care  -BC      Skin Care --  -BC      Wrapping Location lower extremity  -BC      Wrapping Location LE bilateral:  ankles to knees  -BC      Bandage Layers --  -BC      Bandaging Technique moderate compression  -BC      Compression/Skin Care Comments Reduction kit/compression pump x BLE's  -BC            User Key  (r) = Recorded By, (t) = Taken By, (c) = Cosigned By    Initials Name Provider Type    Nicolasa Rizo OT Occupational Therapist                         OT Assessment/Plan     Row Name 07/01/22 1232          OT Assessment    Assessment Comments Pt. positioned in supported  recline for manual lymph drainage, compression pump, donning of reduction kits.  -BC           User Key  (r) = Recorded By, (t) = Taken By, (c) = Cosigned By    Initials Name Provider Type    Nicolasa Rizo OT Occupational Therapist                       OT Goals     Row Name 07/01/22 1600          OT Short Term Goals    STG Date to Achieve 07/25/22  -BC     STG 1 Pt. will be familiar w/ precautions, skin care, and self management of lymphedema.  -BC     STG 1 Progress Met  -BC     STG 2 Pt. will decrease pitting edema to3to4/4 to reduce risk of infection.  -BC     STG 2 Progress Met  -BC     STG 3 Pt. will reduce overall girth by 5cm to decrease risk of infection.  -BC     STG 3 Progress Not Met  -BC     STG 4 Pt. will be familiar w/ HEP to assist w/ improved lymphatic flow.  -BC     STG 4 Progress Progressing  -BC            Long Term Goals    LTG Date to Achieve 07/25/22  -BC     LTG 1 Pt./ will be independent w/ short stretch compression bandaging for girth reduction.  -BC     LTG 1 Progress Goal Revised  -BC     LTG 2 Pt. will decrease pitting edema to 3/4 to reduce risk of infection.  -BC     LTG 2 Progress Ongoing  -BC     LTG 3 Pt. will reduce overall girth by 10cm to decrease risk of infection.  -BC     LTG 3 Progress Not Met  -BC     LTG 4 Pt./ will be independent w/ donning/doffing compression garment.  -BC     LTG 4 Progress Met  -BC     LTG 5 Pt. will be independent w/ lymphedema management and HEP.  -BC     LTG 5 Progress Progressing  -BC           User Key  (r) = Recorded By, (t) = Taken By, (c) = Cosigned By    Initials Name Provider Type    Nicolasa Rizo OT Occupational Therapist                Therapy Education  Given: Symptoms/condition management  Program: Reinforced  How Provided: Verbal  Provided to: Patient  Level of Understanding: Verbalized                Time Calculation:   OT Start Time: 1415  OT Stop Time: 1515  OT Time Calculation (min): 60 min  OT Non-Billable  Time (min): 15 min     Therapy Charges for Today     Code Description Service Date Service Provider Modifiers Qty    49243115521 HC OT LYMPHEDEMA MANAGEMENT-15 MIN 7/1/2022 Nicolasa Reid OT KX 1    [id number]  OT MANUAL THERAPY EA 15 MIN 7/1/2022 Nicolasa Reid OT GO, KX 2    [id number]  OT VASOPNEUMAT DEVIC 1 OR MORE AREAS 7/1/2022 Nicolasa Reid OT GO, KX 1                      Nicolasa Reid OT  7/1/2022

## 2022-07-13 ENCOUNTER — APPOINTMENT (OUTPATIENT)
Dept: OCCUPATIONAL THERAPY | Facility: HOSPITAL | Age: 67
End: 2022-07-13

## 2022-07-27 ENCOUNTER — APPOINTMENT (OUTPATIENT)
Dept: OCCUPATIONAL THERAPY | Facility: HOSPITAL | Age: 67
End: 2022-07-27

## 2022-08-08 ENCOUNTER — HOSPITAL ENCOUNTER (OUTPATIENT)
Dept: OCCUPATIONAL THERAPY | Facility: HOSPITAL | Age: 67
Setting detail: THERAPIES SERIES
Discharge: HOME | End: 2022-08-08

## 2022-08-08 DIAGNOSIS — Q82.0 HEREDITARY LYMPHEDEMA: ICD-10-CM

## 2022-08-08 DIAGNOSIS — I89.0 LYMPHEDEMA OF BOTH LOWER EXTREMITIES: Primary | ICD-10-CM

## 2022-08-08 PROCEDURE — 97139 UNLISTED THERAPEUTIC PX: CPT

## 2022-08-08 PROCEDURE — 97168 OT RE-EVAL EST PLAN CARE: CPT

## 2022-08-08 PROCEDURE — 97140 MANUAL THERAPY 1/> REGIONS: CPT

## 2022-08-08 NOTE — THERAPY RE-EVALUATION
Outpatient Occupational Therapy Lymphedema Re-Evaluation  TEVIN Gilliam     Patient Name: Neelam Gibbs  : 1955  MRN: [id number]  Today's Date: 2022      Visit Date: 2022    Patient Active Problem List   Diagnosis   • Glucocorticoid deficiency (HCC)        Past Medical History:   Diagnosis Date   • Acute renal failure (ARF) (HCC)     after allergic reaction to Bactrim   • Adrenal cortical hypofunction (HCC)    • Anxiety and depression    • Chronic kidney disease (CKD)    • DDD (degenerative disc disease), lumbosacral    • DJD (degenerative joint disease)    • Hyperlipidemia    • Hypothyroidism    • Peripheral neuropathy         Past Surgical History:   Procedure Laterality Date   • BACK SURGERY     • BREAST BIOPSY Left     neg   • EYE SURGERY Bilateral    • SHOULDER SURGERY Left    • TOTAL KNEE ARTHROPLASTY Left    • WRIST SURGERY Right          Visit Dx:     ICD-10-CM ICD-9-CM   1. Lymphedema of both lower extremities  I89.0 457.1   2. Hereditary lymphedema  Q82.0 757.0            Lymphedema     Row Name 22 1300             Subjective Pain    Able to rate subjective pain? yes  -AB      Pre-Treatment Pain Level 8  -AB      Subjective Pain Comment back, BLE's  -AB              Subjective Comments    Subjective Comments Pt. presents for tx after having been off w/ COVID. She has reduction kits donned, but is experiencing increased pain. She continued to c/o pain w/ basic pump use. BLE's are bruised in appearance.  -AB              Lymphedema Assessment    Lymphedema Classification RLE:;LLE:;Trunk:;stage 2 (Spontaneously Irreversible);secondary  -AB      Lymphedema Precautions kidney disease  -AB              Lymphedema Edema Assessment    Ptting Edema Category By grade out of 4;By severity  -AB      Pitting Edema Mild;+ 2/4  -AB      Stemmer Sign bilateral:;negative  -AB      [place] Hump bilateral:;negative  -AB              Skin Changes/Observations    Location/Assessment Lower Extremity   -AB      Lower Extremity Conditions bilateral:;hairless;shiny;clean  -AB      Lower Extremity Color/Pigment bilateral:;purple;non-blanchable;brawny;woody;hyperpigmented  -AB      Skin Observations Comment bruising noted bilaterally  -AB              Lymphedema Sensation    Lymphedema Sensation Reports RLE:;numbness;tingling  -AB      Lymphedema Sensation Tests light touch  -AB      Lymphedema Light Touch RLE:;absent sensation;LLE:;mild impairment  -AB              Lymphedema Pulses/Capillary Refill    Lymphedema Pulses/Capillary Refill capillary refill  -AB      Capillary Refill lower extremity capillary refill  -AB      Lower Extremity Capillary Refill left:;right:;less than 3 seconds  -AB              Lymphedema Measurements    Measurement Type(s) Quick Girth;Circumferential  -AB      Quick Girth Areas Lower extremities  -AB      Circumferential Areas Trunk  -AB              LLE Quick Girth (cm)    Met-heads 19 cm  -AB      Mid foot 20 cm  -AB      Smallest ankle 23.4 cm  -AB      Largest calf 44.1 cm  -AB      Tib tuberosity 47.5 cm  -AB      Mid patella 51.5 cm  -AB      Other 1 28.7 cm  -AB      Other 2 37.5 cm  -AB              RLE Quick Girth (cm)    Met-heads 18.7 cm  -AB      Mid foot 20.4 cm  -AB      Smallest ankle 22.8 cm  -AB      Largest calf 47 cm  -AB      Tib tuberosity 49 cm  -AB      Mid patella 54 cm  -AB      Other 1 33.5 cm  -AB      Other 2 40.3 cm  -AB      RLE Quick Girth Total 285.7  -AB              Trunk Circumferential (cm)    Measurement Location 1 navel  -AB      Trunk 1 136.3 cm  -AB      Trunk Circumferential Total 136.3 cm  -AB              Manual Lymphatic Drainage    Manual Lymphatic Drainage extremity treatment;opened regional lymph nodes  -AB      Opened Regional Lymph Nodes inguinal  -AB      Extremity Treatment MLD to full limb  -AB      MLD to Full Limb BLE's  -AB      Manual Lymphatic Drainage Comments no vibration d/t increased pain  -AB      Manual Therapy MLD to BLE's,  abdomen  -AB              Compression/Skin Care    Compression/Skin Care skin care;remove bandages  -AB      Wrapping Location --  -AB      Wrapping Location LE --  -AB      Bandaging Comments pt. requests no bandages/reduction kits this date post tx  -AB      Bandaging Technique --  -AB      Remove Bandages reduction kits doffed prior to tx  -AB      Compression/Skin Care Comments compression pump to abdomen and BLE's  -AB            User Key  (r) = Recorded By, (t) = Taken By, (c) = Cosigned By    Initials Name Provider Type    Felisa Hardwick, OT Occupational Therapist                                  OT Goals     Row Name 08/08/22 1600          OT Short Term Goals    STG Date to Achieve 09/08/22  -AB     STG 1 Pt. will be familiar w/ precautions, skin care, and self management of lymphedema.  -AB     STG 1 Progress Met  -AB     STG 2 Pt. will decrease pitting edema to3to4/4 to reduce risk of infection.  -AB     STG 2 Progress Met  -AB     STG 3 Pt. will reduce overall girth by 5cm to decrease risk of infection.  -AB     STG 3 Progress Met  -AB     STG 4 Pt. will be familiar w/ HEP to assist w/ improved lymphatic flow.  -AB     STG 4 Progress Met  -AB            Long Term Goals    LTG Date to Achieve 11/08/22  -AB     LTG 1 Pt./ will be independent w/ short stretch compression bandaging for girth reduction.  -AB     LTG 1 Progress Met  -AB     LTG 2 Pt. will decrease pitting edema to 3/4 to reduce risk of infection.  -AB     LTG 2 Progress Met  -AB     LTG 3 Pt. will reduce overall girth by 10cm to decrease risk of infection.  -AB     LTG 3 Progress Not Met;Ongoing  -AB     LTG 4 Pt./ will be independent w/ donning/doffing compression garment.  -AB     LTG 4 Progress Met  -AB     LTG 5 Pt. will be independent w/ lymphedema management and HEP.  -AB     LTG 5 Progress Progressing;Ongoing  -AB            Time Calculation    OT Goal Re-Cert Due Date 11/08/22  -AB           User Key  (r) =  Recorded By, (t) = Taken By, (c) = Cosigned By    Initials Name Provider Type    Felisa Hardwick, OT Occupational Therapist                 OT Assessment/Plan     Row Name 08/08/22 1640          OT Assessment    Functional Limitations Decreased safety during functional activities;Limitations in functional capacity and performance;Impaired gait;Performance in self-care ADL;Limitation in home management;Limitations in community activities  -AB     Assessment Comments Pt. has completed greater than 4 weeks of  conservative therapy, follows a low sodium diet, wears reduction kits daily (20-30mmhg), performs exercise and elevation as often as possible, performs self MLD daily to the best of her ability, and uses her basic Entre compression pump daily for 60 minutes on lowest setting despite it causing pain. She continues to struggle w/ maintaining s/s of lymphedema in abdomen and BLE's which is evident in LE and abdominal measurements. Pt. would greatly benefit from a FlexiTouch compression pump to address swelling in abdomen and BLE areas which the basic pump does not address.  -AB     OT Diagnosis Lymphedema  -AB     OT Rehab Potential Good  -AB     Patient/caregiver participated in establishment of treatment plan and goals Yes  -AB     Patient would benefit from skilled therapy intervention Yes  -AB            OT Plan    OT Frequency 1x/week  -AB     Predicted Duration of Therapy Intervention (OT) 90 days  -AB     Planned CPT's? OT RE-EVAL: 37555;OT THER ACT EA 15 MIN: 58783VL;OT THER PROC EA 15 MIN: 04429WH;OT SELF CARE/MGMT/TRAIN 15 MIN: 83966;OT MANUAL THERAPY EA 15 MIN: 58883;OT VASOPNEUMATIC DEVICE: 42718  lymphedema management  -AB     Planned Therapy Interventions (Optional Details) home exercise program;patient/family education;manual therapy techniques  -AB     OT Plan Comments Pt. continues to make progress meeting all STG's, and 3/5 LTG's. She will benefit from continued skilled OT services to  address ongoing s/s of lymphedema.  -AB           User Key  (r) = Recorded By, (t) = Taken By, (c) = Cosigned By    Initials Name Provider Type    Felisa Hardwick, OT Occupational Therapist                          Time Calculation:   OT Start Time: 1300  OT Stop Time: 1410  OT Time Calculation (min): 70 min  OT Non-Billable Time (min): 45 min  Total Timed Code Minutes- OT: 70 minute(s)     Therapy Charges for Today     Code Description Service Date Service Provider Modifiers Qty    [id number]  OT MANUAL THERAPY EA 15 MIN 8/8/2022 Felisa Da Silva, OT GO, KX 4    [id number] HC OT LYMPHEDEMA MANAGEMENT-15 MIN 8/8/2022 Felisa Da Silva, OT KX 1    [id number]  OT RE-EVAL 2 8/8/2022 Felisa Da Silva, OT GO, KX 1                    Felisa Da Silva OT  8/8/2022

## 2022-08-17 ENCOUNTER — HOSPITAL ENCOUNTER (OUTPATIENT)
Dept: OCCUPATIONAL THERAPY | Facility: HOSPITAL | Age: 67
Setting detail: THERAPIES SERIES
Discharge: HOME | End: 2022-08-17

## 2022-08-17 DIAGNOSIS — Q82.0 HEREDITARY LYMPHEDEMA: ICD-10-CM

## 2022-08-17 DIAGNOSIS — I89.0 LYMPHEDEMA OF BOTH LOWER EXTREMITIES: Primary | ICD-10-CM

## 2022-08-17 PROCEDURE — 97016 VASOPNEUMATIC DEVICE THERAPY: CPT

## 2022-08-17 PROCEDURE — 97140 MANUAL THERAPY 1/> REGIONS: CPT

## 2022-08-17 PROCEDURE — 97139 UNLISTED THERAPEUTIC PX: CPT

## 2022-08-17 NOTE — THERAPY TREATMENT NOTE
Outpatient Occupational Therapy Lymphedema Treatment Note  TEVIN Gilliam     Patient Name: Neelam Gibbs  : 1955  MRN: [id number]  Today's Date: 2022      Visit Date: 2022    Patient Active Problem List   Diagnosis   • Glucocorticoid deficiency (HCC)        Past Medical History:   Diagnosis Date   • Acute renal failure (ARF) (HCC)     after allergic reaction to Bactrim   • Adrenal cortical hypofunction (HCC)    • Anxiety and depression    • Chronic kidney disease (CKD)    • DDD (degenerative disc disease), lumbosacral    • DJD (degenerative joint disease)    • Hyperlipidemia    • Hypothyroidism    • Peripheral neuropathy         Past Surgical History:   Procedure Laterality Date   • BACK SURGERY     • BREAST BIOPSY Left     neg   • EYE SURGERY Bilateral    • SHOULDER SURGERY Left    • TOTAL KNEE ARTHROPLASTY Left    • WRIST SURGERY Right          Visit Dx:      ICD-10-CM ICD-9-CM   1. Lymphedema of both lower extremities  I89.0 457.1   2. Hereditary lymphedema  Q82.0 757.0        Lymphedema     Row Name 22 1300             Subjective Pain    Able to rate subjective pain? yes  -BC      Pre-Treatment Pain Level 8  -BC      Subjective Pain Comment Back /BLE's  -BC              Subjective Comments    Subjective Comments Pt. reports no relief from pain no matter what she does, no medication, nothing helps.  -BC              Lymphedema Assessment    Lymphedema Classification RLE:;LLE:;Trunk:;stage 2 (Spontaneously Irreversible);secondary  -BC      Lymphedema Precautions kidney disease  -BC              Lymphedema Edema Assessment    Ptting Edema Category By grade out of 4;By severity  -BC      Pitting Edema Mild;+ 2/4  -BC      Stemmer Sign bilateral:;negative  -BC      [place] Hump bilateral:;negative  -BC              Skin Changes/Observations    Location/Assessment Lower Extremity  -BC      Lower Extremity Conditions bilateral:;hairless;shiny;clean  -BC      Lower Extremity Color/Pigment  bilateral:;purple;non-blanchable;brawny;woody;hyperpigmented  -BC      Skin Observations Comment Bruising BLE's, pt. attributes to basic pump on lowest possible setting.  -BC              Lymphedema Sensation    Lymphedema Sensation Reports RLE:;numbness;tingling  -BC      Lymphedema Sensation Tests light touch  -BC      Lymphedema Light Touch RLE:;absent sensation;LLE:;mild impairment  -BC      Lymphedema Sensation Comments Pt. with increased pain in BLE's w/touch, manual lymph drainage.  -BC              Lymphedema Pulses/Capillary Refill    Lymphedema Pulses/Capillary Refill capillary refill  -BC      Capillary Refill lower extremity capillary refill  -BC      Lower Extremity Capillary Refill left:;right:;less than 3 seconds  -BC              Lymphedema Measurements    Measurement Type(s) Quick Girth;Circumferential  -BC      Quick Girth Areas Lower extremities  -BC      Circumferential Areas Trunk  -BC              LLE Quick Girth (cm)    Met-heads 19.4 cm  -BC      Mid foot 21.5 cm  -BC      Smallest ankle 25 cm  -BC      Largest calf 42 cm  -BC      Tib tuberosity 43 cm  -BC      Mid patella 53.4 cm  -BC      Other 1 31.8 cm  -BC      Other 2 38 cm  -BC              RLE Quick Girth (cm)    Met-heads 19.2 cm  -BC      Mid foot 21.6 cm  -BC      Smallest ankle 24 cm  -BC      Largest calf 43.2 cm  -BC      Tib tuberosity 46.8 cm  -BC      Mid patella 56 cm  -BC      Other 1 34.5 cm  -BC      Other 2 39.8 cm  -BC      RLE Quick Girth Total 285.1  -BC              Trunk Circumferential (cm)    Measurement Location 1 navel  -BC      Trunk 1 165 cm  -BC      Trunk Circumferential Total 165 cm  -BC              Manual Lymphatic Drainage    Manual Lymphatic Drainage extremity treatment;opened regional lymph nodes  -BC      Opened Regional Lymph Nodes inguinal  -BC      Extremity Treatment MLD to full limb  -BC      MLD to Full Limb BLE's  -BC      Manual Lymphatic Drainage Comments Unable to tolerate vibration,  "increased pain.  -BC      Manual Therapy MLD to BLE's, abd. inguinals as tolerated.  -BC              Compression/Skin Care    Compression/Skin Care skin care  -BC      Skin Care moisturizing lotion applied  -BC      Bandaging Comments Pt. reports that she will morris reduction kits when she returns home.  Unable to tolerate multi layered bandages due to increased pain in BLE's.  -BC      Compression/Skin Care Comments Compression pump x Abd., BLE's  -BC            User Key  (r) = Recorded By, (t) = Taken By, (c) = Cosigned By    Initials Name Provider Type    Nicolasa Rizo, OT Occupational Therapist                         OT Assessment/Plan     Row Name 08/17/22 2722          OT Assessment    Assessment Comments Pt. has completed greater than 4 weeks of conservative therapy, initial evaluation on 4/25/22.  Pt. reports following a low sodium diet, wears her reduction kits daily of 20-30mmHg, completes her daily exercise protocol and elevates as often as she can through out her day.  Pt. performs self MLD daily to the best of her ability and uses her basic Entre'compression pump daily for 60 minutes on its lowest setting.  Pt. has utilized the basic home pump since 6-18-22 despite the pain it has caused her in her BLE's, pt. presents with bruising noted on BLE's and reports that it is from the pump.  Pt. advised to not continue home pumping if it is causing her increased pain and brusing her extremities.  Pt. stated that; \"(she) could not get her compression garments on if she did not pump\".  Despite following all recommendations Pt. girth measurements demonstrate the failure of the basic pump, the discoloration, bruising and increased pain indicate that patient requires a different pump to address her ongoing s/s of lymphedema. If there are any questions please contact me at: [phone].  -BC           User Key  (r) = Recorded By, (t) = Taken By, (c) = Cosigned By    Initials Name Provider Type    SANJU Reid, " CARLITA Baldwin Occupational Therapist                          Therapy Education  Given: Symptoms/condition management  Program: Reinforced  How Provided: Verbal  Provided to: Patient  Level of Understanding: Verbalized                Time Calculation:   OT Start Time: 1300  OT Stop Time: 1400  OT Time Calculation (min): 60 min  OT Non-Billable Time (min): 30 min     Therapy Charges for Today     Code Description Service Date Service Provider Modifiers Qty    04711370786 HC OT MANUAL THERAPY EA 15 MIN 8/17/2022 Nicolasa Reid OT GO, KX 2    [id number]  OT LYMPHEDEMA MANAGEMENT-15 MIN 8/17/2022 Nicolasa Reid OT KX 1    [id number]  OT VASOPNEUMAT DEVIC 1 OR MORE AREAS 8/17/2022 Nicolasa Reid OT GO, KX 1                      Nicolasa Reid OT  8/17/2022

## 2022-08-31 ENCOUNTER — APPOINTMENT (OUTPATIENT)
Dept: OCCUPATIONAL THERAPY | Facility: HOSPITAL | Age: 67
End: 2022-08-31

## 2022-09-09 ENCOUNTER — APPOINTMENT (OUTPATIENT)
Dept: OCCUPATIONAL THERAPY | Facility: HOSPITAL | Age: 67
End: 2022-09-09

## 2022-09-14 ENCOUNTER — HOSPITAL ENCOUNTER (OUTPATIENT)
Dept: OCCUPATIONAL THERAPY | Facility: HOSPITAL | Age: 67
Setting detail: THERAPIES SERIES
Discharge: HOME | End: 2022-09-14

## 2022-09-14 ENCOUNTER — APPOINTMENT (OUTPATIENT)
Dept: OCCUPATIONAL THERAPY | Facility: HOSPITAL | Age: 67
End: 2022-09-14

## 2022-09-14 DIAGNOSIS — Q82.0 HEREDITARY LYMPHEDEMA: ICD-10-CM

## 2022-09-14 DIAGNOSIS — I89.0 LYMPHEDEMA OF BOTH LOWER EXTREMITIES: Primary | ICD-10-CM

## 2022-09-14 PROCEDURE — 97535 SELF CARE MNGMENT TRAINING: CPT

## 2022-09-14 PROCEDURE — 97140 MANUAL THERAPY 1/> REGIONS: CPT

## 2022-09-14 NOTE — THERAPY PROGRESS REPORT/RE-CERT
Outpatient Occupational Therapy Lymphedema Progress Note  TEVIN Gilliam     Patient Name: Neelam Gibbs  : 1955  MRN: [id number]  Today's Date: 2022      Visit Date: 2022    Patient Active Problem List   Diagnosis   • Glucocorticoid deficiency (HCC)        Past Medical History:   Diagnosis Date   • Acute renal failure (ARF) (HCC)     after allergic reaction to Bactrim   • Adrenal cortical hypofunction (HCC)    • Anxiety and depression    • Chronic kidney disease (CKD)    • DDD (degenerative disc disease), lumbosacral    • DJD (degenerative joint disease)    • Hyperlipidemia    • Hypothyroidism    • Peripheral neuropathy         Past Surgical History:   Procedure Laterality Date   • BACK SURGERY     • BREAST BIOPSY Left     neg   • EYE SURGERY Bilateral    • SHOULDER SURGERY Left    • TOTAL KNEE ARTHROPLASTY Left    • WRIST SURGERY Right          Visit Dx:      ICD-10-CM ICD-9-CM   1. Lymphedema of both lower extremities  I89.0 457.1   2. Hereditary lymphedema  Q82.0 757.0        Lymphedema     Row Name 22 1300             Subjective Pain    Able to rate subjective pain? yes  -AB      Pre-Treatment Pain Level 8  -AB      Subjective Pain Comment back  -AB              Subjective Comments    Subjective Comments Pt. reports that her back pain is constant.  -AB              Lymphedema Assessment    Lymphedema Classification RLE:;LLE:;Trunk:;stage 2 (Spontaneously Irreversible);secondary  -AB      Lymphedema Precautions kidney disease  -AB              Lymphedema Edema Assessment    Ptting Edema Category By grade out of 4;By severity  -AB      Pitting Edema Mild;+ 2/4  -AB      Stemmer Sign bilateral:;negative  -AB      [place] Hump bilateral:;negative  -AB              Skin Changes/Observations    Location/Assessment Lower Extremity  -AB      Lower Extremity Conditions bilateral:;hairless;shiny;clean  -AB      Lower Extremity Color/Pigment bilateral:;purple;non-blanchable;brawny;hyperpigmented   -AB      Skin Observations Comment bruising continues to BLE's  -AB              Lymphedema Sensation    Lymphedema Sensation Reports RLE:;numbness;tingling  -AB      Lymphedema Sensation Tests light touch  -AB      Lymphedema Light Touch RLE:;absent sensation;LLE:;mild impairment  -AB              Lymphedema Pulses/Capillary Refill    Lymphedema Pulses/Capillary Refill capillary refill  -AB      Capillary Refill lower extremity capillary refill  -AB      Lower Extremity Capillary Refill left:;right:;less than 3 seconds  -AB              Lymphedema Measurements    Measurement Type(s) Quick Girth;Circumferential  -AB      Quick Girth Areas Lower extremities  -AB      Circumferential Areas Trunk  -AB              LLE Quick Girth (cm)    Met-heads 19.8 cm  -AB      Mid foot 22.2 cm  -AB      Smallest ankle 26.8 cm  -AB      Largest calf 42.6 cm  -AB      Tib tuberosity 43.6 cm  -AB      Mid patella 49 cm  -AB      Other 1 31.3 cm  -AB      Other 2 39.4 cm  -AB              RLE Quick Girth (cm)    Met-heads 19.8 cm  -AB      Mid foot 21.8 cm  -AB      Smallest ankle 26.1 cm  -AB      Largest calf 43 cm  -AB      Tib tuberosity 45.7 cm  -AB      Mid patella 53.6 cm  -AB      Other 1 32.7 cm  -AB      Other 2 38.5 cm  -AB      RLE Quick Girth Total 281.2  -AB              Trunk Circumferential (cm)    Measurement Location 1 navel  -AB              Manual Lymphatic Drainage    Manual Lymphatic Drainage extremity treatment;opened regional lymph nodes  -AB      Opened Regional Lymph Nodes inguinal  -AB      Extremity Treatment MLD to full limb  -AB      MLD to Full Limb BLE's  -AB      Manual Lymphatic Drainage Comments vibration to RLE w/ good tolerance  -AB      Manual Therapy MLD to BLE's, abdomen, inguinals  -AB              Compression/Skin Care    Compression/Skin Care skin care  -AB      Skin Care moisturizing lotion applied  -AB      Bandaging Comments Pt. reports that she will don farrow wraps when she arrives home   -AB      Compression/Skin Care Comments compression pump to abdomen and BLE's  -AB            User Key  (r) = Recorded By, (t) = Taken By, (c) = Cosigned By    Initials Name Provider Type    Felisa Hardwick OT Occupational Therapist                         OT Assessment/Plan     Row Name 09/14/22 1552          OT Plan    OT Frequency --  1x/month  -AB     Predicted Duration of Therapy Intervention (OT) 30 days  -AB     Planned Therapy Interventions (Optional Details) home exercise program;patient/family education;manual therapy techniques  -AB     OT Plan Comments Pt. will continue to benefit from skilled services to address ongoing s/s of lymphedema. Continue POC.  -AB           User Key  (r) = Recorded By, (t) = Taken By, (c) = Cosigned By    Initials Name Provider Type    Felisa Hardwick OT Occupational Therapist                       OT Goals     Row Name 09/14/22 1500          OT Short Term Goals    STG Date to Achieve 10/08/22  -AB     STG 1 Pt. will be familiar w/ precautions, skin care, and self management of lymphedema.  -AB     STG 1 Progress Met  -AB     STG 2 Pt. will decrease pitting edema to3to4/4 to reduce risk of infection.  -AB     STG 2 Progress Met  -AB     STG 3 Pt. will reduce overall girth by 5cm to decrease risk of infection.  -AB     STG 3 Progress Met  -AB     STG 4 Pt. will be familiar w/ HEP to assist w/ improved lymphatic flow.  -AB     STG 4 Progress Met  -AB            Long Term Goals    LTG Date to Achieve 11/08/22  -AB     LTG 1 Pt./ will be independent w/ short stretch compression bandaging for girth reduction.  -AB     LTG 1 Progress Met  -AB     LTG 2 Pt. will decrease pitting edema to 3/4 to reduce risk of infection.  -AB     LTG 2 Progress Met  -AB     LTG 3 Pt. will reduce overall girth by 10cm to decrease risk of infection.  -AB     LTG 3 Progress Met  -AB     LTG 4 Pt./ will be independent w/ donning/doffing compression garment.  -AB      LTG 4 Progress Met  -AB     LTG 5 Pt. will be independent w/ lymphedema management and HEP.  -AB     LTG 5 Progress Progressing;Ongoing  -AB            Time Calculation    OT Goal Re-Cert Due Date 11/08/22  -AB           User Key  (r) = Recorded By, (t) = Taken By, (c) = Cosigned By    Initials Name Provider Type    Felisa Hardwick, OT Occupational Therapist                Therapy Education  Given: HEP, Edema management, Symptoms/condition management, Bandaging/dressing change  Program: Reinforced  How Provided: Verbal, Demonstration  Provided to: Patient  Level of Understanding: Verbalized                Time Calculation:   OT Start Time: 1300  OT Stop Time: 1415  OT Time Calculation (min): 75 min  OT Non-Billable Time (min): 35 min  Total Timed Code Minutes- OT: 75 minute(s)     Therapy Charges for Today     Code Description Service Date Service Provider Modifiers Qty    [id number]  OT MANUAL THERAPY EA 15 MIN 9/14/2022 Felisa Da Silva OT GO, KX 4    [id number]  OT SELF CARE/MGMT/TRAIN EA 15 MIN 9/14/2022 Felisa Da Silva OT GO, KX 1                      Felisa Da Silva OT  9/14/2022

## 2022-09-19 RX ORDER — DEXAMETHASONE 0.5 MG/1
TABLET ORAL
Qty: 45 TABLET | Refills: 0 | Status: SHIPPED | OUTPATIENT
Start: 2022-09-19 | End: 2022-10-10

## 2022-09-19 NOTE — TELEPHONE ENCOUNTER
Patient called to get scheduled. She stated there is no way she can get to [place] and it is easier for her to get to [place]. She is scheduled to see Stacey on 10/10/22. Please advise.     Last ov 1/13/21

## 2022-09-28 ENCOUNTER — APPOINTMENT (OUTPATIENT)
Dept: OCCUPATIONAL THERAPY | Facility: HOSPITAL | Age: 67
End: 2022-09-28

## 2022-10-05 ENCOUNTER — APPOINTMENT (OUTPATIENT)
Dept: OCCUPATIONAL THERAPY | Facility: HOSPITAL | Age: 67
End: 2022-10-05

## 2022-10-10 ENCOUNTER — OFFICE VISIT (OUTPATIENT)
Dept: ENDOCRINOLOGY | Facility: CLINIC | Age: 67
End: 2022-10-10

## 2022-10-10 VITALS
HEIGHT: 57 IN | WEIGHT: 245.2 LBS | BODY MASS INDEX: 52.9 KG/M2 | SYSTOLIC BLOOD PRESSURE: 138 MMHG | HEART RATE: 111 BPM | DIASTOLIC BLOOD PRESSURE: 92 MMHG | OXYGEN SATURATION: 97 %

## 2022-10-10 DIAGNOSIS — E27.49 GLUCOCORTICOID DEFICIENCY: Primary | Chronic | ICD-10-CM

## 2022-10-10 PROCEDURE — 99213 OFFICE O/P EST LOW 20 MIN: CPT | Performed by: NURSE PRACTITIONER

## 2022-10-10 RX ORDER — EZETIMIBE 10 MG/1
TABLET ORAL
COMMUNITY
Start: 2022-09-07

## 2022-10-10 RX ORDER — HYDROCORTISONE 10 MG/1
TABLET ORAL
Qty: 225 TABLET | Refills: 0 | Status: SHIPPED | OUTPATIENT
Start: 2022-10-10 | End: 2022-10-28

## 2022-10-10 RX ORDER — EZETIMIBE 10 MG/1
TABLET ORAL
COMMUNITY

## 2022-10-10 NOTE — PROGRESS NOTES
"Chief Complaint   Patient presents with   • Addisons disease     Follow up        HPI   Neelam Gibbs is a 67 y.o. female had concerns including Addisons disease (Follow up).      She has adrenal insufficiency dx'd over 10 years ago.  She was having issues with bruising so she had a change to her medication. She remains on alternating doses of dexamethasone 0.75 mg and 0.5 mg every other day.  She reports that weight is relatively stable.  She reports that energy level is poor, but attributes this to her back pain.  She is followed by pain management for back injections and spinal stimulator.  She reports that she is still notes easy bruising.  She reports this began after starting the dexamethasone.  She did not notice this with the hydrocortisone and that she felt better on the hydrocortisone.   She recently had lab work at her PCP. Those were all in normal range.    The following portions of the patient's history were reviewed and updated as appropriate: allergies, current medications, past family history, past medical history, past social history, past surgical history and problem list.    Review of Systems   Constitutional:        Leg swelling   Musculoskeletal: Positive for back pain.   Skin: Positive for bruise.   All other systems reviewed and are negative.     /92 (BP Location: Left arm, Patient Position: Sitting, Cuff Size: Adult)   Pulse 111   Ht 144.8 cm (57\")   Wt 111 kg (245 lb 3.2 oz)   LMP  (LMP Unknown)   SpO2 97%   BMI 53.06 kg/m²      Physical Exam  Vitals reviewed.   Constitutional:       Appearance: Normal appearance.   Eyes:      Extraocular Movements: Extraocular movements intact.   Cardiovascular:      Rate and Rhythm: Tachycardia present.   Pulmonary:      Effort: Pulmonary effort is normal.   Skin:     Findings: Bruising present.   Neurological:      Mental Status: She is alert and oriented to person, place, and time.   Psychiatric:         Mood and Affect: Mood normal.       "   Behavior: Behavior normal.         Thought Content: Thought content normal.         Judgment: Judgment normal.       Labs/Imaging      Assessment and Plan    Diagnoses and all orders for this visit:    1. Glucocorticoid deficiency (HCC) (Primary)  Assessment & Plan:  She is stable symptomatically and labs remain stable. She would like to try going back on the hydrocortisone to see if this will resolve some of her increased bruising.  Will switch her from dexamethasone to hydrocortisone as a trial for 3-4 weeks.  Informed that if she develops any dizziness, unsteady gait or any other symptoms, to contact office for instructions on how to switch back to dexamethasone.  Follow-up in 6 months.      Other orders  -     hydrocortisone (CORTEF) 10 MG tablet; Take 15 mg every morning and 10 mg every evening.  Dispense: 225 tablet; Refill: 0       Return in about 6 months (around 4/10/2023) for Follow-up appointment. The patient was instructed to contact the clinic with any interval questions or concerns.      This document has been electronically signed by DOROTEO Sharma  October 10, 2022 12:43 EDT   Endocrinology

## 2022-10-10 NOTE — ASSESSMENT & PLAN NOTE
She is stable symptomatically and labs remain stable. She would like to try going back on the hydrocortisone to see if this will resolve some of her increased bruising.  Will switch her from dexamethasone to hydrocortisone as a trial for 3-4 weeks.  Informed that if she develops any dizziness, unsteady gait or any other symptoms, to contact office for instructions on how to switch back to dexamethasone.  Follow-up in 6 months.

## 2022-10-12 ENCOUNTER — APPOINTMENT (OUTPATIENT)
Dept: OCCUPATIONAL THERAPY | Facility: HOSPITAL | Age: 67
End: 2022-10-12

## 2022-10-12 ENCOUNTER — DOCUMENTATION (OUTPATIENT)
Dept: OCCUPATIONAL THERAPY | Facility: HOSPITAL | Age: 67
End: 2022-10-12

## 2022-10-12 NOTE — THERAPY DISCHARGE NOTE
Outpatient Occupational Therapy Lymphedema Treatment Note/Discharge Summary       Patient Name: Neelam Gibbs  : 1955  MRN: [id number]  Today's Date: 10/12/2022      Visit Date: 10/12/2022    Patient Active Problem List   Diagnosis   • Glucocorticoid deficiency (HCC)        Past Medical History:   Diagnosis Date   • Acute renal failure (ARF) (HCC)     after allergic reaction to Bactrim   • Adrenal cortical hypofunction (HCC)    • Anxiety and depression    • Chronic kidney disease (CKD)    • DDD (degenerative disc disease), lumbosacral    • DJD (degenerative joint disease)    • Hyperlipidemia    • Hypothyroidism    • Peripheral neuropathy         Past Surgical History:   Procedure Laterality Date   • BACK SURGERY     • BREAST BIOPSY Left     neg   • EYE SURGERY Bilateral    • SHOULDER SURGERY Left    • TOTAL KNEE ARTHROPLASTY Left    • WRIST SURGERY Right          Visit Dx:    No diagnosis found.                                 OT Goals     Row Name 10/12/22 1600          OT Short Term Goals    STG Date to Achieve 10/08/22  -AB     STG 1 Pt. will be familiar w/ precautions, skin care, and self management of lymphedema.  -AB     STG 1 Progress Met  -AB     STG 2 Pt. will decrease pitting edema to3to4/4 to reduce risk of infection.  -AB     STG 2 Progress Met  -AB     STG 3 Pt. will reduce overall girth by 5cm to decrease risk of infection.  -AB     STG 3 Progress Met  -AB     STG 4 Pt. will be familiar w/ HEP to assist w/ improved lymphatic flow.  -AB     STG 4 Progress Met  -AB        Long Term Goals    LTG Date to Achieve 22  -AB     LTG 1 Pt./ will be independent w/ short stretch compression bandaging for girth reduction.  -AB     LTG 1 Progress Met  -AB     LTG 2 Pt. will decrease pitting edema to 3/4 to reduce risk of infection.  -AB     LTG 2 Progress Met  -AB     LTG 3 Pt. will reduce overall girth by 10cm to decrease risk of infection.  -AB     LTG 3 Progress Met  -AB     LTG 4  Pt./ will be independent w/ donning/doffing compression garment.  -AB     LTG 4 Progress Met  -AB     LTG 5 Pt. will be independent w/ lymphedema management and HEP.  -AB     LTG 5 Progress Met  -AB           User Key  (r) = Recorded By, (t) = Taken By, (c) = Cosigned By    Initials Name Provider Type    Felisa Hardwick, OT Occupational Therapist                                 Time Calculation:                     OP OT Discharge Summary  Reason for Discharge: Maximum functional potential achieved  Outcomes Achieved: Refer to plan of care for updates on goals achieved  Discharge Instructions: Therapist and pt. discussed discharge on 10/11/12, and we are both in agreement that she is ready at this time. She has all necessary knowledge and equpment needed to manage s/s of lymphedema at home. She is encouraged to call w/ any questions/concerns.      Felisa Da Silva, OT  10/12/2022

## 2022-10-24 ENCOUNTER — TELEPHONE (OUTPATIENT)
Dept: ENDOCRINOLOGY | Facility: CLINIC | Age: 67
End: 2022-10-24

## 2022-10-24 NOTE — TELEPHONE ENCOUNTER
Pt called and sts that the Sunday after she seen us (so 10/16/22) she started taking her new medication (hydrocortisone). Before she started taking it she was dizzy and off balance, but her symptoms did not improve once she started taking medicine. She sts that she has been taking an extra 5 mg in the afternoon and that does seem to help, but she is still having dizziness and off balance moments. She is wondering if there is anything she can do for this?

## 2022-10-25 NOTE — TELEPHONE ENCOUNTER
Please let her know that I am not so sure that her dizziness is in relation to her medication, since she was having these symptoms with the other medication as well.  It would probably benefit her to be evaluated by ENT to see if there is another cause of her dizziness.  Just let me know.

## 2022-10-25 NOTE — TELEPHONE ENCOUNTER
Pt does not want to be seen by the ENT right now. She is going to continue taking the extra 5 mg of meds at night and if she doesn't feel better soon, she will give us another call .

## 2022-10-28 ENCOUNTER — TELEPHONE (OUTPATIENT)
Dept: ENDOCRINOLOGY | Facility: CLINIC | Age: 67
End: 2022-10-28

## 2022-10-28 RX ORDER — DEXAMETHASONE 0.5 MG/1
0.5 TABLET ORAL
Qty: 30 TABLET | Refills: 2 | Status: SHIPPED | OUTPATIENT
Start: 2022-10-28 | End: 2022-12-19

## 2022-10-28 NOTE — TELEPHONE ENCOUNTER
Pt called to report that th hydrocotisone is not working for her. She would like to be put back on Dexamethasone. She sts she needs 0.50 and 0.75 called in as she switches them from day to day. She would like it sent to [facility].

## 2022-12-19 RX ORDER — DEXAMETHASONE 0.5 MG/1
TABLET ORAL
Qty: 45 TABLET | Refills: 1 | Status: SHIPPED | OUTPATIENT
Start: 2022-12-19

## 2023-05-11 ENCOUNTER — OFFICE VISIT (OUTPATIENT)
Dept: ENDOCRINOLOGY | Facility: CLINIC | Age: 68
End: 2023-05-11
Payer: MEDICARE

## 2023-05-11 VITALS
HEART RATE: 115 BPM | HEIGHT: 57 IN | DIASTOLIC BLOOD PRESSURE: 90 MMHG | SYSTOLIC BLOOD PRESSURE: 152 MMHG | BODY MASS INDEX: 52.42 KG/M2 | OXYGEN SATURATION: 95 % | WEIGHT: 243 LBS

## 2023-05-11 DIAGNOSIS — E27.49 GLUCOCORTICOID DEFICIENCY: Primary | Chronic | ICD-10-CM

## 2023-05-11 PROCEDURE — 99214 OFFICE O/P EST MOD 30 MIN: CPT | Performed by: INTERNAL MEDICINE

## 2023-05-11 RX ORDER — DEXAMETHASONE 0.5 MG/1
0.5 TABLET ORAL
Qty: 90 TABLET | Refills: 3 | Status: SHIPPED | OUTPATIENT
Start: 2023-05-11

## 2023-05-11 RX ORDER — AMITRIPTYLINE HYDROCHLORIDE 25 MG/1
25 TABLET, FILM COATED ORAL NIGHTLY
COMMUNITY

## 2023-05-11 NOTE — ASSESSMENT & PLAN NOTE
She has a long hx of this.   At this point she appears to be on a bit more dexamethasone than she needs.  She looks cushingoid,  She has excessive bruising and her bp and bs are high. I suggest she reduce her dose to 0.5 mg daily  --  She tells be that she has osteoporosis although I don' t have the bone density results.  Such patients with renal insufficiency cannot really take bisphosphonates and  PROLIA  Would be a great option if indeed she has osteoporosis. I told her I would let dr pandey know what I was thinking and if she wanted me to address treating the osteoporosis, I would need copies of her bone density tests.

## 2023-05-11 NOTE — PROGRESS NOTES
"     Office Note      Date: 2023  Patient Name: Neelam Gibbs  MRN: [id number]  : 1955    Chief Complaint   Patient presents with   • Follow-up   for laz's     History of Present Illness:   Neelam Gibbs is a 67 y.o. female who presents for Follow-up  . addisons   Current rx:steroid replacement     Changes in history:notes pain and bruising   Questions /problems:feels really rough today     Subjective          Review of Systems:   Review of Systems   Constitutional: Positive for fatigue.   Musculoskeletal: Positive for arthralgias and joint swelling.   Hematological: Bruises/bleeds easily.       The following portions of the patient's history were reviewed and updated as appropriate: allergies, current medications, past family history, past medical history, past social history, past surgical history and problem list.    Objective     Visit Vitals  /90 (BP Location: Right arm, Patient Position: Sitting, Cuff Size: Large Adult)   Pulse 115   Ht 144.8 cm (57\")   Wt 110 kg (243 lb)   SpO2 95%   BMI 52.58 kg/m²           Physical Exam:  Physical Exam  Vitals reviewed.   Constitutional:       Comments: cushingoid   HENT:      Head: Normocephalic and atraumatic.   Eyes:      Extraocular Movements: Extraocular movements intact.   Skin:     Comments: echymosis    Neurological:      Mental Status: She is alert.   Psychiatric:         Mood and Affect: Mood normal.         Thought Content: Thought content normal.         Judgment: Judgment normal.         Assessment / Plan      Assessment & Plan:  Problem List Items Addressed This Visit        Other    Glucocorticoid deficiency - Primary (Chronic)    Current Assessment & Plan     She has a long hx of this.   At this point she appears to be on a bit more dexamethasone than she needs.  She looks cushingoid,  She has excessive bruising and her bp and bs are high. I suggest she reduce her dose to 0.5 mg daily  --  She tells be that she has osteoporosis " although I don' t have the bone density results.  Such patients with renal insufficiency cannot really take bisphosphonates and  PROLIA  Would be a great option if indeed she has osteoporosis. I told her I would let dr pandey know what I was thinking and if she wanted me to address treating the osteoporosis, I would need copies of her bone density tests.              Saroj Francis MD   05/11/2023

## 2023-08-14 ENCOUNTER — TRANSCRIBE ORDERS (OUTPATIENT)
Dept: ADMINISTRATIVE | Facility: HOSPITAL | Age: 68
End: 2023-08-14
Payer: MEDICARE

## 2023-08-14 DIAGNOSIS — Z12.31 SCREENING MAMMOGRAM FOR BREAST CANCER: Primary | ICD-10-CM

## 2023-08-29 ENCOUNTER — HOSPITAL ENCOUNTER (OUTPATIENT)
Dept: MAMMOGRAPHY | Facility: HOSPITAL | Age: 68
Discharge: HOME | End: 2023-08-29
Admitting: INTERNAL MEDICINE
Payer: MEDICARE

## 2023-08-29 DIAGNOSIS — Z12.31 SCREENING MAMMOGRAM FOR BREAST CANCER: ICD-10-CM

## 2023-08-29 PROCEDURE — 77067 SCR MAMMO BI INCL CAD: CPT

## 2023-08-29 PROCEDURE — 77063 BREAST TOMOSYNTHESIS BI: CPT

## 2023-11-13 ENCOUNTER — OFFICE VISIT (OUTPATIENT)
Dept: ENDOCRINOLOGY | Facility: CLINIC | Age: 68
End: 2023-11-13
Payer: MEDICARE

## 2023-11-13 VITALS
DIASTOLIC BLOOD PRESSURE: 68 MMHG | BODY MASS INDEX: 53.88 KG/M2 | SYSTOLIC BLOOD PRESSURE: 142 MMHG | OXYGEN SATURATION: 90 % | WEIGHT: 249 LBS | HEART RATE: 112 BPM

## 2023-11-13 DIAGNOSIS — M81.0 AGE-RELATED OSTEOPOROSIS WITHOUT CURRENT PATHOLOGICAL FRACTURE: ICD-10-CM

## 2023-11-13 DIAGNOSIS — E27.49 GLUCOCORTICOID DEFICIENCY: Primary | Chronic | ICD-10-CM

## 2023-11-13 PROCEDURE — 99214 OFFICE O/P EST MOD 30 MIN: CPT | Performed by: NURSE PRACTITIONER

## 2023-11-13 PROCEDURE — 1160F RVW MEDS BY RX/DR IN RCRD: CPT | Performed by: NURSE PRACTITIONER

## 2023-11-13 PROCEDURE — 1159F MED LIST DOCD IN RCRD: CPT | Performed by: NURSE PRACTITIONER

## 2023-11-13 NOTE — ASSESSMENT & PLAN NOTE
She is stable symptomatically and labs remain stable. Will decrease Dexamethasone to 0.25 mg QD.  She continue to appear cushiongoid and has increased bruises noted.  Follow-up in 6 months.

## 2023-11-13 NOTE — PROGRESS NOTES
Chief Complaint   Patient presents with    Follow-up        HPI   Neelam Gibbs is a 68 y.o. female had concerns including Follow-up.    Adrenal Insufficiency    She has adrenal insufficiency dx'd over 10 years ago.  She was having issues with bruising so she had a change to her medication. She is taking dexamethasone 0.5 mg every day. She reports that weight is relatively stable.  She reports that energy level is poor, but attributes this to her back pain.  She is followed by pain management for back injections and spinal stimulator.  She reports that she is still notes easy bruising.    She recently had lab work at her PCP. Those were all in normal range.    She has not taken any meds for osteoporosis in the past. She reports that she had a DEXA Scan last week at her PCP office.      The following portions of the patient's history were reviewed and updated as appropriate: allergies, current medications, past family history, past medical history, past social history, past surgical history and problem list.    Review of Systems   Constitutional:  Positive for fatigue.        Leg swelling   Musculoskeletal:  Positive for arthralgias, back pain and joint swelling.   Skin:  Positive for bruise.   All other systems reviewed and are negative.     /68 (BP Location: Left arm, Patient Position: Sitting, Cuff Size: Adult)   Pulse 112   Wt 113 kg (249 lb)   SpO2 90%   BMI 53.88 kg/m²      Physical Exam  Vitals reviewed.   Constitutional:       Appearance: Normal appearance.      Comments: Cushingoid appearance.   Eyes:      Extraocular Movements: Extraocular movements intact.   Cardiovascular:      Rate and Rhythm: Tachycardia present.   Pulmonary:      Effort: Pulmonary effort is normal.   Skin:     Findings: Bruising present.   Neurological:      Mental Status: She is alert and oriented to person, place, and time.   Psychiatric:         Mood and Affect: Mood normal.         Behavior: Behavior normal.          Thought Content: Thought content normal.         Judgment: Judgment normal.       Labs/Imaging      Assessment and Plan    Diagnoses and all orders for this visit:    1. Glucocorticoid deficiency (Primary)  Assessment & Plan:  She is stable symptomatically and labs remain stable. Will decrease Dexamethasone to 0.25 mg QD.  She continue to appear cushiongoid and has increased bruises noted.  Follow-up in 6 months.      2. Age-related osteoporosis without current pathological fracture  Assessment & Plan:  Will obtain DEXA Scan report from PCP office and will get scheduled for Prolia.             No follow-ups on file. The patient was instructed to contact the clinic with any interval questions or concerns.      This document has been electronically signed by DOROTEO Sharma  November 13, 2023 16:30 EST   Endocrinology

## 2024-05-13 ENCOUNTER — OFFICE VISIT (OUTPATIENT)
Dept: ENDOCRINOLOGY | Facility: CLINIC | Age: 69
End: 2024-05-13
Payer: MEDICARE

## 2024-05-13 VITALS — SYSTOLIC BLOOD PRESSURE: 105 MMHG | OXYGEN SATURATION: 96 % | HEART RATE: 86 BPM | DIASTOLIC BLOOD PRESSURE: 70 MMHG

## 2024-05-13 DIAGNOSIS — E27.49 GLUCOCORTICOID DEFICIENCY: Primary | Chronic | ICD-10-CM

## 2024-05-13 PROCEDURE — 1160F RVW MEDS BY RX/DR IN RCRD: CPT | Performed by: NURSE PRACTITIONER

## 2024-05-13 PROCEDURE — 99213 OFFICE O/P EST LOW 20 MIN: CPT | Performed by: NURSE PRACTITIONER

## 2024-05-13 PROCEDURE — 1159F MED LIST DOCD IN RCRD: CPT | Performed by: NURSE PRACTITIONER

## 2024-05-13 RX ORDER — DEXAMETHASONE 0.5 MG/1
0.5 TABLET ORAL
Qty: 90 TABLET | Refills: 3 | Status: SHIPPED | OUTPATIENT
Start: 2024-05-13

## 2024-05-13 RX ORDER — PREGABALIN 75 MG/1
75 CAPSULE ORAL 2 TIMES DAILY
COMMUNITY

## 2024-05-13 RX ORDER — FOLIC ACID 1 MG/1
1 TABLET ORAL DAILY
COMMUNITY

## 2024-05-13 RX ORDER — LEVOTHYROXINE SODIUM 0.05 MG/1
50 TABLET ORAL
COMMUNITY

## 2024-05-13 RX ORDER — CITALOPRAM 40 MG/1
40 TABLET ORAL DAILY
COMMUNITY

## 2024-05-13 RX ORDER — METHOTREXATE 2.5 MG/1
2.5 TABLET ORAL
COMMUNITY

## 2024-05-13 NOTE — PROGRESS NOTES
Chief Complaint   Patient presents with    Glucocorticoid deficiency        HPI   Neelam Gibbs is a 68 y.o. female had concerns including Glucocorticoid deficiency.    Adrenal Insufficiency    She tried to reduce her medication to 0.25 mg daily and started having increased dizziness and nausea.  She ended up going back on 0.5 mg daily and it has resolved.  She has been diagnosed with RA and is doing infusions and is taking Methotrexate weekly.    History:  She has adrenal insufficiency dx'd over 10 years ago.  She was having issues with bruising so she had a change to her medication. She is taking dexamethasone 0.5 mg every day. She reports that weight is relatively stable.  She reports that energy level is poor, but attributes this to her back pain.  She is followed by pain management for back injections and spinal stimulator.  She reports that she is still notes easy bruising.    She recently had lab work at her PCP. Those were all in normal range.    She has not taken any meds for osteoporosis in the past. She reports that she had a DEXA Scan last week at her PCP office.      The following portions of the patient's history were reviewed and updated as appropriate: allergies, current medications, past family history, past medical history, past social history, past surgical history and problem list.    Review of Systems   Constitutional:  Positive for fatigue.        Leg swelling   Musculoskeletal:  Positive for arthralgias, back pain and joint swelling.   Skin:  Positive for bruise.   All other systems reviewed and are negative.     /70 (BP Location: Left arm, Patient Position: Sitting, Cuff Size: Adult)   Pulse 86   SpO2 96%      Physical Exam  Vitals reviewed.   Constitutional:       Appearance: Normal appearance.      Comments: Cushingoid appearance.  In a wheelchair.   Eyes:      Extraocular Movements: Extraocular movements intact.   Cardiovascular:      Rate and Rhythm: Normal rate.   Pulmonary:       Effort: Pulmonary effort is normal.   Skin:     Findings: Bruising present.   Neurological:      Mental Status: She is alert and oriented to person, place, and time.   Psychiatric:         Mood and Affect: Mood normal.         Behavior: Behavior normal.         Thought Content: Thought content normal.         Judgment: Judgment normal.       Labs/Imaging      Assessment and Plan    Diagnoses and all orders for this visit:    1. Glucocorticoid deficiency (Primary)  Assessment & Plan:  She is stable symptomatically and labs remain stable. Will continue Dexamethasone to 0.5 mg QD.  She continue to appear cushiongoid and has increased bruises noted.  Follow-up in 6 months.      Other orders  -     dexAMETHasone (DECADRON) 0.5 MG tablet; Take 1 tablet by mouth Daily With Breakfast.  Dispense: 90 tablet; Refill: 3             Return in about 6 months (around 11/13/2024) for Follow-up appointment. The patient was instructed to contact the clinic with any interval questions or concerns.      This document has been electronically signed by DOROTEO Sharma  May 13, 2024 16:03 EDT   Endocrinology

## 2024-05-13 NOTE — ASSESSMENT & PLAN NOTE
She is stable symptomatically and labs remain stable. Will continue Dexamethasone to 0.5 mg QD.  She continue to appear cushiongoid and has increased bruises noted.  Follow-up in 6 months.

## 2024-10-17 ENCOUNTER — TRANSCRIBE ORDERS (OUTPATIENT)
Dept: ADMINISTRATIVE | Facility: HOSPITAL | Age: 69
End: 2024-10-17
Payer: MEDICARE

## 2024-10-17 DIAGNOSIS — Z12.31 VISIT FOR SCREENING MAMMOGRAM: Primary | ICD-10-CM

## 2024-10-31 ENCOUNTER — HOSPITAL ENCOUNTER (OUTPATIENT)
Dept: MAMMOGRAPHY | Facility: HOSPITAL | Age: 69
Discharge: HOME | End: 2024-10-31
Admitting: INTERNAL MEDICINE
Payer: MEDICARE

## 2024-10-31 DIAGNOSIS — Z12.31 VISIT FOR SCREENING MAMMOGRAM: ICD-10-CM

## 2024-10-31 PROCEDURE — 77063 BREAST TOMOSYNTHESIS BI: CPT

## 2024-10-31 PROCEDURE — 77067 SCR MAMMO BI INCL CAD: CPT

## 2024-11-03 PROCEDURE — 77063 BREAST TOMOSYNTHESIS BI: CPT | Performed by: RADIOLOGY

## 2024-11-03 PROCEDURE — 77067 SCR MAMMO BI INCL CAD: CPT | Performed by: RADIOLOGY

## 2024-11-13 ENCOUNTER — OFFICE VISIT (OUTPATIENT)
Dept: ENDOCRINOLOGY | Facility: CLINIC | Age: 69
End: 2024-11-13
Payer: MEDICARE

## 2024-11-13 VITALS
OXYGEN SATURATION: 98 % | WEIGHT: 181.8 LBS | DIASTOLIC BLOOD PRESSURE: 82 MMHG | SYSTOLIC BLOOD PRESSURE: 118 MMHG | HEART RATE: 102 BPM | BODY MASS INDEX: 39.34 KG/M2

## 2024-11-13 DIAGNOSIS — E27.49 GLUCOCORTICOID DEFICIENCY: Primary | Chronic | ICD-10-CM

## 2024-11-13 RX ORDER — DEXAMETHASONE 0.5 MG/1
0.5 TABLET ORAL
Qty: 90 TABLET | Refills: 3 | Status: SHIPPED | OUTPATIENT
Start: 2024-11-13

## 2024-11-13 NOTE — PROGRESS NOTES
Chief Complaint   Patient presents with    Thyroid Problem     Thyroid f/u, started infusions with RA called orencia        HPI   Neelam Gibbs is a 69 y.o. female had concerns including Thyroid Problem (Thyroid f/u, started infusions with RA called orencia).    Adrenal Insufficiency    She is currently taking Dexamethasone 0.5 mg daily and is doing well overall.  She denies and hypotensive episodes.  She has been diagnosed with RA and is doing infusions and is taking Orencia. She was on methotrexate with pneumonia, sepsis and respiratory failure.  They stopped the medication at that time due to that hospitalization.  She cannot tell much that it is helping much.    History:  She has adrenal insufficiency dx'd over 10 years ago.  She was having issues with bruising so she had a change to her medication. She is taking dexamethasone 0.5 mg every day. She reports that weight is relatively stable.  She reports that energy level is poor, but attributes this to her back pain.  She is followed by pain management for back injections and spinal stimulator.  She reports that she is still notes easy bruising.    She recently had lab work at her PCP. Those were all in normal range.    She has not taken any meds for osteoporosis in the past. She reports that she had a DEXA Scan last week at her PCP office.      The following portions of the patient's history were reviewed and updated as appropriate: allergies, current medications, past family history, past medical history, past social history, past surgical history and problem list.    Review of Systems   Constitutional:  Positive for fatigue.        Leg swelling   Musculoskeletal:  Positive for arthralgias, back pain and joint swelling.   Skin:  Positive for bruise.   All other systems reviewed and are negative.     /82 (BP Location: Left arm, Patient Position: Sitting, Cuff Size: Adult)   Pulse 102   Wt 82.5 kg (181 lb 12.8 oz)   SpO2 98%   BMI 39.34 kg/m²       Physical Exam  Vitals reviewed.   Constitutional:       Appearance: Normal appearance.      Comments: Cushingoid appearance.  Using as rolator walker to ambulate.   Eyes:      Extraocular Movements: Extraocular movements intact.   Cardiovascular:      Rate and Rhythm: Normal rate.   Pulmonary:      Effort: Pulmonary effort is normal.   Skin:     Findings: Bruising present.   Neurological:      Mental Status: She is alert and oriented to person, place, and time.   Psychiatric:         Mood and Affect: Mood normal.         Behavior: Behavior normal.         Thought Content: Thought content normal.         Judgment: Judgment normal.       Labs/Imaging      Assessment and Plan    Diagnoses and all orders for this visit:    1. Glucocorticoid deficiency (Primary)  Assessment & Plan:  She is stable symptomatically and labs remain stable. Will continue Dexamethasone to 0.5 mg QD.  She continue to appear cushiongoid and has increased bruises noted.  Follow-up in 6 months.      Other orders  -     dexAMETHasone (DECADRON) 0.5 MG tablet; Take 1 tablet by mouth Daily With Breakfast.  Dispense: 90 tablet; Refill: 3               Return in about 6 months (around 5/13/2025) for Follow-up appointment. The patient was instructed to contact the clinic with any interval questions or concerns.      This document has been electronically signed by DOROTEO Sharma  November 15, 2024 22:08 EST   Endocrinology    Please note that portions of this document were completed with a voice recognition program. Efforts were made to edit the dictations, but occasionally words are mis-transcribed.

## 2024-12-20 ENCOUNTER — TELEPHONE (OUTPATIENT)
Dept: ENDOCRINOLOGY | Facility: CLINIC | Age: 69
End: 2024-12-20
Payer: MEDICARE

## 2024-12-20 NOTE — TELEPHONE ENCOUNTER
Returned call to Prabha Ruiz.  She saw the pt and felt she has PMR and possibly giant cell arteritis.  ESR and CRP were high.  They wanted to treat the pt with prednisone 60mg daily for about 2 weeks.  The pt wanted them to speak with us first.  I said it was okay to treat with the prednisone.  I suggested continuing the dexamethasone while on the prednisone.  That way she would already be on it as they start weaning the prednisone.

## 2024-12-26 ENCOUNTER — OFFICE VISIT (OUTPATIENT)
Dept: SURGERY | Facility: CLINIC | Age: 69
End: 2024-12-26
Payer: MEDICARE

## 2024-12-26 DIAGNOSIS — R51.9 NONINTRACTABLE EPISODIC HEADACHE, UNSPECIFIED HEADACHE TYPE: Primary | ICD-10-CM

## 2024-12-26 PROCEDURE — 1159F MED LIST DOCD IN RCRD: CPT | Performed by: SURGERY

## 2024-12-26 PROCEDURE — 1160F RVW MEDS BY RX/DR IN RCRD: CPT | Performed by: SURGERY

## 2024-12-26 PROCEDURE — 99203 OFFICE O/P NEW LOW 30 MIN: CPT | Performed by: SURGERY

## 2024-12-26 RX ORDER — PREDNISONE 2.5 MG/1
2.5 TABLET ORAL DAILY
COMMUNITY

## 2024-12-26 NOTE — H&P (VIEW-ONLY)
Subjective   Neelam Gibbs is a 69 y.o. female.     Chief Complaint: headaches    History of Present Illness She is a obese 68 yo with a long history of rheumatoid arthritis on steroids who has had headaches that are worse recently. She has had markedly elevate sed rates and CRP so a temporal artery biopsy was requested. She has had some blurry vision but on her recent eye exam only a slight cataract was seen.     The following portions of the patient's history were reviewed and updated as appropriate: current medications, past family history, past medical history, past social history, past surgical history and problem list.    Review of Systems   Constitutional:  Negative for activity change, appetite change, chills, fever and unexpected weight change.   HENT:  Negative for congestion, facial swelling and sore throat.    Eyes:  Negative for photophobia and visual disturbance.   Respiratory:  Negative for chest tightness, shortness of breath and wheezing.    Cardiovascular:  Negative for chest pain, palpitations and leg swelling.   Gastrointestinal:  Negative for abdominal distention, abdominal pain, anal bleeding, blood in stool, constipation, diarrhea, nausea, rectal pain and vomiting.   Endocrine: Negative for cold intolerance, heat intolerance, polydipsia and polyuria.   Genitourinary:  Negative for difficulty urinating, dysuria, flank pain and urgency.   Musculoskeletal:  Positive for arthralgias, back pain, joint swelling and neck pain. Negative for myalgias.   Skin:  Negative for rash and wound.   Allergic/Immunologic: Negative for immunocompromised state.   Neurological:  Positive for weakness and headaches. Negative for dizziness, seizures, syncope, light-headedness and numbness.   Hematological:  Negative for adenopathy. Does not bruise/bleed easily.   Psychiatric/Behavioral:  Negative for behavioral problems and confusion. The patient is not nervous/anxious.        Objective   Physical Exam  Vitals  reviewed.   Constitutional:       General: She is not in acute distress.     Appearance: She is well-developed. She is not ill-appearing.   HENT:      Head: Normocephalic. No laceration. Hair is normal.      Right Ear: Hearing and ear canal normal.      Left Ear: Hearing and ear canal normal.      Nose: Nose normal.      Right Sinus: No maxillary sinus tenderness or frontal sinus tenderness.      Left Sinus: No maxillary sinus tenderness or frontal sinus tenderness.   Eyes:      General: Lids are normal.      Conjunctiva/sclera: Conjunctivae normal.      Pupils: Pupils are equal, round, and reactive to light.   Neck:      Thyroid: No thyroid mass or thyromegaly.      Vascular: No JVD.      Trachea: No tracheal tenderness or tracheal deviation.   Cardiovascular:      Rate and Rhythm: Normal rate and regular rhythm.      Heart sounds: No murmur heard.     No gallop.   Pulmonary:      Effort: Pulmonary effort is normal.      Breath sounds: Normal breath sounds. No stridor. No wheezing.   Chest:      Chest wall: No tenderness.   Abdominal:      General: Bowel sounds are normal. There is no distension.      Palpations: Abdomen is soft. There is no mass.      Tenderness: There is no abdominal tenderness. There is no guarding or rebound.      Hernia: No hernia is present.   Musculoskeletal:         General: Deformity present.      Cervical back: Normal range of motion.   Lymphadenopathy:      Cervical: No cervical adenopathy.      Upper Body:      Right upper body: No supraclavicular adenopathy.      Left upper body: No supraclavicular adenopathy.   Skin:     General: Skin is warm and dry.      Coloration: Skin is not pale.      Findings: No erythema or rash.   Neurological:      Mental Status: She is alert and oriented to person, place, and time.      Motor: No abnormal muscle tone.   Psychiatric:         Behavior: Behavior normal.         Thought Content: Thought content normal.         Past Medical History:   Diagnosis  Date    Acute renal failure (ARF)     after allergic reaction to Bactrim    Adrenal cortical hypofunction     Anxiety and depression     Chronic kidney disease (CKD)     DDD (degenerative disc disease), lumbosacral     DJD (degenerative joint disease)     Hyperlipidemia     Hypothyroidism     Peripheral neuropathy        Family History   Problem Relation Age of Onset    Heart disease Mother     Edema Mother     Heart disease Father     Hypertension Father     Breast cancer Sister 39       Social History     Tobacco Use    Smoking status: Former     Passive exposure: Past    Smokeless tobacco: Current     Types: Chew   Vaping Use    Vaping status: Never Used   Substance Use Topics    Alcohol use: Never    Drug use: Never       Past Surgical History:   Procedure Laterality Date    BACK SURGERY      BREAST BIOPSY Left 2014    neg    EYE SURGERY Bilateral     SHOULDER SURGERY Left     TOTAL KNEE ARTHROPLASTY Left     WRIST SURGERY Right        Current Outpatient Medications   Medication Instructions    citalopram (CELEXA) 40 mg, Daily    cloNIDine (CATAPRES-TTS) 0.1 MG/24HR patch .03    cyanocobalamin 1000 MCG/ML injection No dose, route, or frequency recorded.    dexAMETHasone (DECADRON) 0.5 mg, Oral, Daily With Breakfast    esomeprazole (nexIUM) 40 MG capsule No dose, route, or frequency recorded.    ezetimibe (ZETIA) 10 MG tablet No dose, route, or frequency recorded.    HYDROcodone-acetaminophen (NORCO)  MG per tablet 1 tablet, 3 Times Daily    levothyroxine (SYNTHROID, LEVOTHROID) 50 mcg, Every Early Morning    lidocaine (LIDODERM) 5 % No dose, route, or frequency recorded.    predniSONE (DELTASONE) 2.5 mg, Daily    pregabalin (LYRICA) 75 mg, 2 Times Daily    vitamin D (ERGOCALCIFEROL) 1.25 MG (35059 UT) capsule capsule Tuesday only         Assessment & Plan   Diagnoses and all orders for this visit:    1. Nonintractable episodic headache, unspecified headache type (Primary)    Temporal artery biopsy              This document has been electronically signed by Eric Knox MD   December 26, 2024 14:13 EST

## 2024-12-26 NOTE — PROGRESS NOTES
Subjective   Neelam Gibbs is a 69 y.o. female.     Chief Complaint: headaches    History of Present Illness She is a obese 68 yo with a long history of rheumatoid arthritis on steroids who has had headaches that are worse recently. She has had markedly elevate sed rates and CRP so a temporal artery biopsy was requested. She has had some blurry vision but on her recent eye exam only a slight cataract was seen.     The following portions of the patient's history were reviewed and updated as appropriate: current medications, past family history, past medical history, past social history, past surgical history and problem list.    Review of Systems   Constitutional:  Negative for activity change, appetite change, chills, fever and unexpected weight change.   HENT:  Negative for congestion, facial swelling and sore throat.    Eyes:  Negative for photophobia and visual disturbance.   Respiratory:  Negative for chest tightness, shortness of breath and wheezing.    Cardiovascular:  Negative for chest pain, palpitations and leg swelling.   Gastrointestinal:  Negative for abdominal distention, abdominal pain, anal bleeding, blood in stool, constipation, diarrhea, nausea, rectal pain and vomiting.   Endocrine: Negative for cold intolerance, heat intolerance, polydipsia and polyuria.   Genitourinary:  Negative for difficulty urinating, dysuria, flank pain and urgency.   Musculoskeletal:  Positive for arthralgias, back pain, joint swelling and neck pain. Negative for myalgias.   Skin:  Negative for rash and wound.   Allergic/Immunologic: Negative for immunocompromised state.   Neurological:  Positive for weakness and headaches. Negative for dizziness, seizures, syncope, light-headedness and numbness.   Hematological:  Negative for adenopathy. Does not bruise/bleed easily.   Psychiatric/Behavioral:  Negative for behavioral problems and confusion. The patient is not nervous/anxious.        Objective   Physical Exam  Vitals  reviewed.   Constitutional:       General: She is not in acute distress.     Appearance: She is well-developed. She is not ill-appearing.   HENT:      Head: Normocephalic. No laceration. Hair is normal.      Right Ear: Hearing and ear canal normal.      Left Ear: Hearing and ear canal normal.      Nose: Nose normal.      Right Sinus: No maxillary sinus tenderness or frontal sinus tenderness.      Left Sinus: No maxillary sinus tenderness or frontal sinus tenderness.   Eyes:      General: Lids are normal.      Conjunctiva/sclera: Conjunctivae normal.      Pupils: Pupils are equal, round, and reactive to light.   Neck:      Thyroid: No thyroid mass or thyromegaly.      Vascular: No JVD.      Trachea: No tracheal tenderness or tracheal deviation.   Cardiovascular:      Rate and Rhythm: Normal rate and regular rhythm.      Heart sounds: No murmur heard.     No gallop.   Pulmonary:      Effort: Pulmonary effort is normal.      Breath sounds: Normal breath sounds. No stridor. No wheezing.   Chest:      Chest wall: No tenderness.   Abdominal:      General: Bowel sounds are normal. There is no distension.      Palpations: Abdomen is soft. There is no mass.      Tenderness: There is no abdominal tenderness. There is no guarding or rebound.      Hernia: No hernia is present.   Musculoskeletal:         General: Deformity present.      Cervical back: Normal range of motion.   Lymphadenopathy:      Cervical: No cervical adenopathy.      Upper Body:      Right upper body: No supraclavicular adenopathy.      Left upper body: No supraclavicular adenopathy.   Skin:     General: Skin is warm and dry.      Coloration: Skin is not pale.      Findings: No erythema or rash.   Neurological:      Mental Status: She is alert and oriented to person, place, and time.      Motor: No abnormal muscle tone.   Psychiatric:         Behavior: Behavior normal.         Thought Content: Thought content normal.         Past Medical History:   Diagnosis  Date    Acute renal failure (ARF)     after allergic reaction to Bactrim    Adrenal cortical hypofunction     Anxiety and depression     Chronic kidney disease (CKD)     DDD (degenerative disc disease), lumbosacral     DJD (degenerative joint disease)     Hyperlipidemia     Hypothyroidism     Peripheral neuropathy        Family History   Problem Relation Age of Onset    Heart disease Mother     Edema Mother     Heart disease Father     Hypertension Father     Breast cancer Sister 39       Social History     Tobacco Use    Smoking status: Former     Passive exposure: Past    Smokeless tobacco: Current     Types: Chew   Vaping Use    Vaping status: Never Used   Substance Use Topics    Alcohol use: Never    Drug use: Never       Past Surgical History:   Procedure Laterality Date    BACK SURGERY      BREAST BIOPSY Left 2014    neg    EYE SURGERY Bilateral     SHOULDER SURGERY Left     TOTAL KNEE ARTHROPLASTY Left     WRIST SURGERY Right        Current Outpatient Medications   Medication Instructions    citalopram (CELEXA) 40 mg, Daily    cloNIDine (CATAPRES-TTS) 0.1 MG/24HR patch .03    cyanocobalamin 1000 MCG/ML injection No dose, route, or frequency recorded.    dexAMETHasone (DECADRON) 0.5 mg, Oral, Daily With Breakfast    esomeprazole (nexIUM) 40 MG capsule No dose, route, or frequency recorded.    ezetimibe (ZETIA) 10 MG tablet No dose, route, or frequency recorded.    HYDROcodone-acetaminophen (NORCO)  MG per tablet 1 tablet, 3 Times Daily    levothyroxine (SYNTHROID, LEVOTHROID) 50 mcg, Every Early Morning    lidocaine (LIDODERM) 5 % No dose, route, or frequency recorded.    predniSONE (DELTASONE) 2.5 mg, Daily    pregabalin (LYRICA) 75 mg, 2 Times Daily    vitamin D (ERGOCALCIFEROL) 1.25 MG (84298 UT) capsule capsule Tuesday only         Assessment & Plan   Diagnoses and all orders for this visit:    1. Nonintractable episodic headache, unspecified headache type (Primary)    Temporal artery biopsy              This document has been electronically signed by Eric Knox MD   December 26, 2024 14:13 EST

## 2024-12-27 ENCOUNTER — HOSPITAL ENCOUNTER (OUTPATIENT)
Facility: HOSPITAL | Age: 69
Setting detail: HOSPITAL OUTPATIENT SURGERY
Discharge: HOME | End: 2024-12-27
Attending: SURGERY | Admitting: SURGERY
Payer: MEDICARE

## 2024-12-27 ENCOUNTER — ANESTHESIA EVENT (OUTPATIENT)
Dept: PERIOP | Facility: HOSPITAL | Age: 69
End: 2024-12-27
Payer: MEDICARE

## 2024-12-27 ENCOUNTER — ANESTHESIA (OUTPATIENT)
Dept: PERIOP | Facility: HOSPITAL | Age: 69
End: 2024-12-27
Payer: MEDICARE

## 2024-12-27 VITALS
TEMPERATURE: 97.9 F | OXYGEN SATURATION: 99 % | WEIGHT: 183 LBS | DIASTOLIC BLOOD PRESSURE: 84 MMHG | SYSTOLIC BLOOD PRESSURE: 168 MMHG | HEART RATE: 72 BPM | HEIGHT: 57 IN | RESPIRATION RATE: 14 BRPM | BODY MASS INDEX: 39.48 KG/M2

## 2024-12-27 DIAGNOSIS — R51.9 NONINTRACTABLE EPISODIC HEADACHE, UNSPECIFIED HEADACHE TYPE: ICD-10-CM

## 2024-12-27 PROCEDURE — 25010000002 PROPOFOL 200 MG/20ML EMULSION: Performed by: NURSE ANESTHETIST, CERTIFIED REGISTERED

## 2024-12-27 PROCEDURE — 25010000002 FENTANYL CITRATE (PF) 50 MCG/ML SOLUTION: Performed by: NURSE ANESTHETIST, CERTIFIED REGISTERED

## 2024-12-27 PROCEDURE — 25010000002 ONDANSETRON PER 1 MG: Performed by: NURSE ANESTHETIST, CERTIFIED REGISTERED

## 2024-12-27 PROCEDURE — 25010000002 BUPIVACAINE 0.5 % SOLUTION: Performed by: SURGERY

## 2024-12-27 PROCEDURE — 25010000002 DEXAMETHASONE PER 1 MG: Performed by: NURSE ANESTHETIST, CERTIFIED REGISTERED

## 2024-12-27 PROCEDURE — 25010000002 LIDOCAINE PF 2% 2 % SOLUTION: Performed by: NURSE ANESTHETIST, CERTIFIED REGISTERED

## 2024-12-27 RX ORDER — OXYCODONE AND ACETAMINOPHEN 5; 325 MG/1; MG/1
1 TABLET ORAL ONCE AS NEEDED
Status: DISCONTINUED | OUTPATIENT
Start: 2024-12-27 | End: 2024-12-27 | Stop reason: HOSPADM

## 2024-12-27 RX ORDER — ONDANSETRON 2 MG/ML
4 INJECTION INTRAMUSCULAR; INTRAVENOUS AS NEEDED
Status: DISCONTINUED | OUTPATIENT
Start: 2024-12-27 | End: 2024-12-27 | Stop reason: HOSPADM

## 2024-12-27 RX ORDER — LIDOCAINE HYDROCHLORIDE 20 MG/ML
INJECTION, SOLUTION EPIDURAL; INFILTRATION; INTRACAUDAL; PERINEURAL AS NEEDED
Status: DISCONTINUED | OUTPATIENT
Start: 2024-12-27 | End: 2024-12-27 | Stop reason: SURG

## 2024-12-27 RX ORDER — PROPOFOL 10 MG/ML
INJECTION, EMULSION INTRAVENOUS AS NEEDED
Status: DISCONTINUED | OUTPATIENT
Start: 2024-12-27 | End: 2024-12-27 | Stop reason: SURG

## 2024-12-27 RX ORDER — ONDANSETRON 2 MG/ML
INJECTION INTRAMUSCULAR; INTRAVENOUS AS NEEDED
Status: DISCONTINUED | OUTPATIENT
Start: 2024-12-27 | End: 2024-12-27 | Stop reason: SURG

## 2024-12-27 RX ORDER — FENTANYL CITRATE 50 UG/ML
INJECTION, SOLUTION INTRAMUSCULAR; INTRAVENOUS AS NEEDED
Status: DISCONTINUED | OUTPATIENT
Start: 2024-12-27 | End: 2024-12-27 | Stop reason: SURG

## 2024-12-27 RX ORDER — MEPERIDINE HYDROCHLORIDE 25 MG/ML
12.5 INJECTION INTRAMUSCULAR; INTRAVENOUS; SUBCUTANEOUS
Status: DISCONTINUED | OUTPATIENT
Start: 2024-12-27 | End: 2024-12-27 | Stop reason: HOSPADM

## 2024-12-27 RX ORDER — IPRATROPIUM BROMIDE AND ALBUTEROL SULFATE 2.5; .5 MG/3ML; MG/3ML
3 SOLUTION RESPIRATORY (INHALATION) ONCE AS NEEDED
Status: DISCONTINUED | OUTPATIENT
Start: 2024-12-27 | End: 2024-12-27 | Stop reason: HOSPADM

## 2024-12-27 RX ORDER — SODIUM CHLORIDE 0.9 % (FLUSH) 0.9 %
SYRINGE (ML) INJECTION AS NEEDED
Status: DISCONTINUED | OUTPATIENT
Start: 2024-12-27 | End: 2024-12-27 | Stop reason: HOSPADM

## 2024-12-27 RX ORDER — SODIUM CHLORIDE 0.9 % (FLUSH) 0.9 %
10 SYRINGE (ML) INJECTION AS NEEDED
Status: DISCONTINUED | OUTPATIENT
Start: 2024-12-27 | End: 2024-12-27 | Stop reason: HOSPADM

## 2024-12-27 RX ORDER — HYDROCODONE BITARTRATE AND ACETAMINOPHEN 5; 325 MG/1; MG/1
TABLET ORAL EVERY 6 HOURS PRN
Qty: 8 TABLET | Refills: 0 | Status: SHIPPED | OUTPATIENT
Start: 2024-12-27

## 2024-12-27 RX ORDER — SODIUM CHLORIDE 9 MG/ML
40 INJECTION, SOLUTION INTRAVENOUS AS NEEDED
Status: DISCONTINUED | OUTPATIENT
Start: 2024-12-27 | End: 2024-12-27 | Stop reason: HOSPADM

## 2024-12-27 RX ORDER — SODIUM CHLORIDE, SODIUM LACTATE, POTASSIUM CHLORIDE, CALCIUM CHLORIDE 600; 310; 30; 20 MG/100ML; MG/100ML; MG/100ML; MG/100ML
125 INJECTION, SOLUTION INTRAVENOUS ONCE
Status: DISCONTINUED | OUTPATIENT
Start: 2024-12-27 | End: 2024-12-27 | Stop reason: HOSPADM

## 2024-12-27 RX ORDER — FAMOTIDINE 10 MG/ML
INJECTION, SOLUTION INTRAVENOUS AS NEEDED
Status: DISCONTINUED | OUTPATIENT
Start: 2024-12-27 | End: 2024-12-27 | Stop reason: SURG

## 2024-12-27 RX ORDER — BUPIVACAINE HYDROCHLORIDE 5 MG/ML
INJECTION, SOLUTION PERINEURAL AS NEEDED
Status: DISCONTINUED | OUTPATIENT
Start: 2024-12-27 | End: 2024-12-27 | Stop reason: HOSPADM

## 2024-12-27 RX ORDER — MIDAZOLAM HYDROCHLORIDE 1 MG/ML
0.5 INJECTION, SOLUTION INTRAMUSCULAR; INTRAVENOUS
Status: DISCONTINUED | OUTPATIENT
Start: 2024-12-27 | End: 2024-12-27 | Stop reason: HOSPADM

## 2024-12-27 RX ORDER — DEXAMETHASONE SODIUM PHOSPHATE 4 MG/ML
INJECTION, SOLUTION INTRA-ARTICULAR; INTRALESIONAL; INTRAMUSCULAR; INTRAVENOUS; SOFT TISSUE AS NEEDED
Status: DISCONTINUED | OUTPATIENT
Start: 2024-12-27 | End: 2024-12-27 | Stop reason: SURG

## 2024-12-27 RX ORDER — SODIUM CHLORIDE 0.9 % (FLUSH) 0.9 %
10 SYRINGE (ML) INJECTION EVERY 12 HOURS SCHEDULED
Status: DISCONTINUED | OUTPATIENT
Start: 2024-12-27 | End: 2024-12-27 | Stop reason: HOSPADM

## 2024-12-27 RX ORDER — FENTANYL CITRATE 50 UG/ML
50 INJECTION, SOLUTION INTRAMUSCULAR; INTRAVENOUS
Status: DISCONTINUED | OUTPATIENT
Start: 2024-12-27 | End: 2024-12-27 | Stop reason: HOSPADM

## 2024-12-27 RX ADMIN — FENTANYL CITRATE 100 MCG: 50 INJECTION INTRAMUSCULAR; INTRAVENOUS at 11:38

## 2024-12-27 RX ADMIN — FAMOTIDINE 20 MG: 10 INJECTION, SOLUTION INTRAVENOUS at 11:38

## 2024-12-27 RX ADMIN — ONDANSETRON 4 MG: 2 INJECTION INTRAMUSCULAR; INTRAVENOUS at 11:38

## 2024-12-27 RX ADMIN — PROPOFOL 150 MG: 10 INJECTION, EMULSION INTRAVENOUS at 11:44

## 2024-12-27 RX ADMIN — LIDOCAINE HYDROCHLORIDE 100 MG: 20 INJECTION, SOLUTION EPIDURAL; INFILTRATION; INTRACAUDAL; PERINEURAL at 11:44

## 2024-12-27 RX ADMIN — DEXAMETHASONE SODIUM PHOSPHATE 4 MG: 4 INJECTION, SOLUTION INTRA-ARTICULAR; INTRALESIONAL; INTRAMUSCULAR; INTRAVENOUS; SOFT TISSUE at 11:44

## 2024-12-27 NOTE — ANESTHESIA POSTPROCEDURE EVALUATION
Patient: Neelam Gibbs    Procedure Summary       Date: 12/27/24 Room / Location: [facility] OR 01 /  [facility] OR    Anesthesia Start: 1138 Anesthesia Stop: 1210    Procedure: TEMPORAL ARTERY BIOPSY (Left: Head) Diagnosis:       Nonintractable episodic headache, unspecified headache type      (Nonintractable episodic headache, unspecified headache type [R51.9])    Surgeons: Eric Knox MD Provider: Enoc Sutton MD    Anesthesia Type: general, MAC ASA Status: 3            Anesthesia Type: general, MAC    Vitals  Vitals Value Taken Time   /89 12/27/24 1237   Temp 98.5 °F (36.9 °C) 12/27/24 1212   Pulse 69 12/27/24 1239   Resp 14 12/27/24 1237   SpO2 98 % 12/27/24 1239   Vitals shown include unfiled device data.        Post Anesthesia Care and Evaluation    Patient location during evaluation: PHASE II  Patient participation: complete - patient participated  Level of consciousness: awake and alert  Pain score: 1  Pain management: adequate    Airway patency: patent  Anesthetic complications: No anesthetic complications  PONV Status: controlled  Cardiovascular status: acceptable  Respiratory status: acceptable  Hydration status: acceptable

## 2024-12-27 NOTE — OP NOTE
TEMPORAL ARTERY BIOPSY  Procedure Note    Neelam Gibbs  12/27/2024    Pre-op Diagnosis:   Nonintractable episodic headache, unspecified headache type [R51.9]    Post-op Diagnosis: same        Procedure(s):  TEMPORAL ARTERY BIOPSY    Surgeon(s):  Eric Knox MD    Anesthesia: Anesthesia type not filed in the log.    Staff:   Circulator: Cat Osullivan RN  Scrub Person: Abhi Green Kathy    Estimated Blood Loss: minimal    Specimens:                Order Name Source Comment Collection Info Order Time   TISSUE EXAM, [facility] ([facility], [facility]) Temporal Artery  Collected By: Eric Knox MD 12/27/2024 12:01 PM     Release to patient   Routine Release              Drains: * No LDAs found *    Procedure: The left temple was prepped and draped. The doppler was used to localize the temporal artery. The incision was made with the scalpel and the cautery used to go down through the subcutaneous tissue. The artery was dissected out about 1 inch long. It was ligated at either end and the segment removed. The wound was closed with vicryl and local injected.    Findings:             Complications: none   Grafts / Implants N/A    Eric Knox MD     Date: 12/27/2024  Time: 12:07 EST

## 2024-12-27 NOTE — ANESTHESIA PREPROCEDURE EVALUATION
Anesthesia Evaluation     no history of anesthetic complications:   NPO Solid Status: > 8 hours  NPO Liquid Status: > 8 hours           Airway   Mallampati: II  TM distance: >3 FB  Neck ROM: full  No difficulty expected  Dental    (+) poor dentition    Pulmonary - normal exam   Cardiovascular - normal exam    (+) hyperlipidemia      Neuro/Psych  (+) headaches, numbness, psychiatric history  GI/Hepatic/Renal/Endo    (+) renal disease-, thyroid problem hypothyroidism    Musculoskeletal     (+) back pain  Abdominal  - normal exam    Bowel sounds: normal.   Substance History      OB/GYN          Other   arthritis,                 Anesthesia Plan    ASA 3     general and MAC     intravenous induction     Anesthetic plan, risks, benefits, and alternatives have been provided, discussed and informed consent has been obtained with: patient.    CODE STATUS:

## 2024-12-27 NOTE — ANESTHESIA PROCEDURE NOTES
Airway  Urgency: elective    Date/Time: 12/27/2024 11:44 AM  Airway not difficult    General Information and Staff    Patient location during procedure: OR  CRNA/CAA: Monika Hull CRNA    Indications and Patient Condition    Preoxygenated: yes  MILS not maintained throughout  Mask difficulty assessment: 0 - not attempted    Final Airway Details  Final airway type: supraglottic airway      Successful airway: unique  Size 4     Number of attempts at approach: 1  Assessment: lips, teeth, and gum same as pre-op and atraumatic intubation    Additional Comments  Atraumatic LMA placement, dentition unchanged.

## 2024-12-28 ENCOUNTER — HOSPITAL ENCOUNTER (EMERGENCY)
Facility: HOSPITAL | Age: 69
Discharge: HOME | End: 2024-12-29
Attending: STUDENT IN AN ORGANIZED HEALTH CARE EDUCATION/TRAINING PROGRAM
Payer: MEDICARE

## 2024-12-28 ENCOUNTER — APPOINTMENT (OUTPATIENT)
Dept: GENERAL RADIOLOGY | Facility: HOSPITAL | Age: 69
End: 2024-12-28
Payer: MEDICARE

## 2024-12-28 DIAGNOSIS — E27.1 ADDISON'S DISEASE: ICD-10-CM

## 2024-12-28 DIAGNOSIS — I10 PRIMARY HYPERTENSION: ICD-10-CM

## 2024-12-28 DIAGNOSIS — M06.9 RHEUMATOID ARTHRITIS INVOLVING BOTH HANDS, UNSPECIFIED WHETHER RHEUMATOID FACTOR PRESENT: ICD-10-CM

## 2024-12-28 DIAGNOSIS — R10.13 DYSPEPSIA: Primary | ICD-10-CM

## 2024-12-28 LAB
ALBUMIN SERPL-MCNC: 3.8 G/DL (ref 3.5–5.2)
ALBUMIN/GLOB SERPL: 1.6 G/DL
ALP SERPL-CCNC: 74 U/L (ref 39–117)
ALT SERPL W P-5'-P-CCNC: 26 U/L (ref 1–33)
ANION GAP SERPL CALCULATED.3IONS-SCNC: 14.5 MMOL/L (ref 5–15)
AST SERPL-CCNC: 11 U/L (ref 1–32)
BASOPHILS # BLD AUTO: 0 10*3/MM3 (ref 0–0.2)
BASOPHILS NFR BLD AUTO: 0 % (ref 0–1.5)
BILIRUB SERPL-MCNC: 0.3 MG/DL (ref 0–1.2)
BUN SERPL-MCNC: 32 MG/DL (ref 8–23)
BUN/CREAT SERPL: 22.2 (ref 7–25)
CALCIUM SPEC-SCNC: 8.3 MG/DL (ref 8.6–10.5)
CHLORIDE SERPL-SCNC: 101 MMOL/L (ref 98–107)
CO2 SERPL-SCNC: 23.5 MMOL/L (ref 22–29)
CREAT SERPL-MCNC: 1.44 MG/DL (ref 0.57–1)
DEPRECATED RDW RBC AUTO: 54.2 FL (ref 37–54)
EGFRCR SERPLBLD CKD-EPI 2021: 39.5 ML/MIN/1.73
EOSINOPHIL # BLD AUTO: 0 10*3/MM3 (ref 0–0.4)
EOSINOPHIL NFR BLD AUTO: 0 % (ref 0.3–6.2)
ERYTHROCYTE [DISTWIDTH] IN BLOOD BY AUTOMATED COUNT: 16.4 % (ref 12.3–15.4)
GEN 5 1HR TROPONIN T REFLEX: 15 NG/L
GLOBULIN UR ELPH-MCNC: 2.4 GM/DL
GLUCOSE SERPL-MCNC: 174 MG/DL (ref 65–99)
HCT VFR BLD AUTO: 38.8 % (ref 34–46.6)
HGB BLD-MCNC: 11.6 G/DL (ref 12–15.9)
IMM GRANULOCYTES # BLD AUTO: 0.07 10*3/MM3 (ref 0–0.05)
IMM GRANULOCYTES NFR BLD AUTO: 0.9 % (ref 0–0.5)
INR PPP: 1.05 (ref 0.9–1.1)
LYMPHOCYTES # BLD AUTO: 0.67 10*3/MM3 (ref 0.7–3.1)
LYMPHOCYTES NFR BLD AUTO: 9 % (ref 19.6–45.3)
MCH RBC QN AUTO: 27 PG (ref 26.6–33)
MCHC RBC AUTO-ENTMCNC: 29.9 G/DL (ref 31.5–35.7)
MCV RBC AUTO: 90.2 FL (ref 79–97)
MONOCYTES # BLD AUTO: 0.34 10*3/MM3 (ref 0.1–0.9)
MONOCYTES NFR BLD AUTO: 4.6 % (ref 5–12)
NEUTROPHILS NFR BLD AUTO: 6.39 10*3/MM3 (ref 1.7–7)
NEUTROPHILS NFR BLD AUTO: 85.5 % (ref 42.7–76)
NRBC BLD AUTO-RTO: 0 /100 WBC (ref 0–0.2)
NT-PROBNP SERPL-MCNC: 2152 PG/ML (ref 0–900)
PLATELET # BLD AUTO: 343 10*3/MM3 (ref 140–450)
PMV BLD AUTO: 9.6 FL (ref 6–12)
POTASSIUM SERPL-SCNC: 4.4 MMOL/L (ref 3.5–5.2)
PROT SERPL-MCNC: 6.2 G/DL (ref 6–8.5)
PROTHROMBIN TIME: 13.8 SECONDS (ref 12.1–14.7)
RBC # BLD AUTO: 4.3 10*6/MM3 (ref 3.77–5.28)
REF LAB TEST METHOD: NORMAL
SODIUM SERPL-SCNC: 139 MMOL/L (ref 136–145)
TROPONIN T NUMERIC DELTA: 3 NG/L
TROPONIN T SERPL HS-MCNC: 12 NG/L
WBC NRBC COR # BLD AUTO: 7.47 10*3/MM3 (ref 3.4–10.8)

## 2024-12-28 PROCEDURE — 83880 ASSAY OF NATRIURETIC PEPTIDE: CPT | Performed by: STUDENT IN AN ORGANIZED HEALTH CARE EDUCATION/TRAINING PROGRAM

## 2024-12-28 PROCEDURE — 85610 PROTHROMBIN TIME: CPT | Performed by: STUDENT IN AN ORGANIZED HEALTH CARE EDUCATION/TRAINING PROGRAM

## 2024-12-28 PROCEDURE — 85025 COMPLETE CBC W/AUTO DIFF WBC: CPT | Performed by: STUDENT IN AN ORGANIZED HEALTH CARE EDUCATION/TRAINING PROGRAM

## 2024-12-28 PROCEDURE — 84484 ASSAY OF TROPONIN QUANT: CPT | Performed by: STUDENT IN AN ORGANIZED HEALTH CARE EDUCATION/TRAINING PROGRAM

## 2024-12-28 PROCEDURE — 99284 EMERGENCY DEPT VISIT MOD MDM: CPT

## 2024-12-28 PROCEDURE — 36415 COLL VENOUS BLD VENIPUNCTURE: CPT | Performed by: STUDENT IN AN ORGANIZED HEALTH CARE EDUCATION/TRAINING PROGRAM

## 2024-12-28 PROCEDURE — 80053 COMPREHEN METABOLIC PANEL: CPT | Performed by: STUDENT IN AN ORGANIZED HEALTH CARE EDUCATION/TRAINING PROGRAM

## 2024-12-28 PROCEDURE — 71045 X-RAY EXAM CHEST 1 VIEW: CPT

## 2024-12-28 PROCEDURE — 93005 ELECTROCARDIOGRAM TRACING: CPT | Performed by: STUDENT IN AN ORGANIZED HEALTH CARE EDUCATION/TRAINING PROGRAM

## 2024-12-29 VITALS
HEIGHT: 57 IN | DIASTOLIC BLOOD PRESSURE: 87 MMHG | HEART RATE: 84 BPM | BODY MASS INDEX: 39.48 KG/M2 | OXYGEN SATURATION: 99 % | TEMPERATURE: 97.8 F | WEIGHT: 183 LBS | SYSTOLIC BLOOD PRESSURE: 155 MMHG | RESPIRATION RATE: 18 BRPM

## 2024-12-29 NOTE — ED PROVIDER NOTES
Subjective   History of Present Illness  Patient is a 69-year-old female is brought in by EMS with complaints of chest pain.  Patient has chronic renal insufficiency, adrenal cortical hypofunction, hyperlipidemia and hypothyroidism.  On arrival patient awake alert and oriented and medically stable.    States that she has been on long-term dexamethasone for Davison's disease but recently was started on high-dose prednisone at 60 mg for concern for potential temporal arteritis.  She underwent a temporal biopsy and is pending apology results.  She actually does not have chest pain she states that she had abdominal bloating and fullness in the upper epigastric area as she has not eaten for 2 days due to being nauseated from high-dose steroids.      Review of Systems    Past Medical History:   Diagnosis Date    Acute renal failure (ARF)     after allergic reaction to Bactrim    Adrenal cortical hypofunction     Anxiety and depression     Chronic kidney disease (CKD)     DDD (degenerative disc disease), lumbosacral     DJD (degenerative joint disease)     Hyperlipidemia     Hypothyroidism     Peripheral neuropathy        Allergies   Allergen Reactions    Celecoxib Other (See Comments)    Ciprofloxacin Nausea And Vomiting    Erythromycin Swelling    Niacin Swelling    Nsaids Other (See Comments)    Sulfa Antibiotics Other (See Comments)    Sulfamethoxazole-Trimethoprim Other (See Comments)    Iodinated Contrast Media Other (See Comments)     Other reaction(s): Decreased kidney function       Past Surgical History:   Procedure Laterality Date    BACK SURGERY      BREAST BIOPSY Left 2014    neg    EYE SURGERY Bilateral     SHOULDER SURGERY Left     TOTAL KNEE ARTHROPLASTY Left     WRIST SURGERY Right        Family History   Problem Relation Age of Onset    Heart disease Mother     Edema Mother     Heart disease Father     Hypertension Father     Breast cancer Sister 39       Social History     Socioeconomic History     Marital status:    Tobacco Use    Smoking status: Former     Passive exposure: Past    Smokeless tobacco: Current     Types: Chew   Vaping Use    Vaping status: Never Used   Substance and Sexual Activity    Alcohol use: Never    Drug use: Never    Sexual activity: Defer           Objective   Physical Exam  Vitals and nursing note reviewed.   Constitutional:       General: She is not in acute distress.     Appearance: She is well-developed. She is not diaphoretic.   HENT:      Head: Normocephalic and atraumatic.      Right Ear: External ear normal.      Left Ear: External ear normal.      Nose: Nose normal.   Eyes:      Conjunctiva/sclera: Conjunctivae normal.      Pupils: Pupils are equal, round, and reactive to light.   Neck:      Vascular: No JVD.      Trachea: No tracheal deviation.   Cardiovascular:      Rate and Rhythm: Normal rate and regular rhythm.      Heart sounds: Normal heart sounds. No murmur heard.  Pulmonary:      Effort: Pulmonary effort is normal. No respiratory distress.      Breath sounds: Normal breath sounds. No wheezing.   Abdominal:      General: Bowel sounds are normal.      Palpations: Abdomen is soft.      Tenderness: There is no abdominal tenderness.   Musculoskeletal:         General: No deformity. Normal range of motion.      Cervical back: Normal range of motion and neck supple.   Skin:     General: Skin is warm and dry.      Coloration: Skin is not pale.      Findings: No erythema or rash.   Neurological:      Mental Status: She is alert and oriented to person, place, and time.      Cranial Nerves: No cranial nerve deficit.   Psychiatric:         Behavior: Behavior normal.         Thought Content: Thought content normal.         Procedures       Results for orders placed or performed during the hospital encounter of 12/28/24   ECG 12 Lead Chest Pain    Collection Time: 12/28/24  7:48 PM   Result Value Ref Range    QT Interval 382 ms    QTC Interval 420 ms   High Sensitivity  Troponin T    Collection Time: 12/28/24  7:50 PM    Specimen: Arm, Right; Blood   Result Value Ref Range    HS Troponin T 12 <14 ng/L   Comprehensive Metabolic Panel    Collection Time: 12/28/24  7:50 PM    Specimen: Arm, Right; Blood   Result Value Ref Range    Glucose 174 (H) 65 - 99 mg/dL    BUN 32 (H) 8 - 23 mg/dL    Creatinine 1.44 (H) 0.57 - 1.00 mg/dL    Sodium 139 136 - 145 mmol/L    Potassium 4.4 3.5 - 5.2 mmol/L    Chloride 101 98 - 107 mmol/L    CO2 23.5 22.0 - 29.0 mmol/L    Calcium 8.3 (L) 8.6 - 10.5 mg/dL    Total Protein 6.2 6.0 - 8.5 g/dL    Albumin 3.8 3.5 - 5.2 g/dL    ALT (SGPT) 26 1 - 33 U/L    AST (SGOT) 11 1 - 32 U/L    Alkaline Phosphatase 74 39 - 117 U/L    Total Bilirubin 0.3 0.0 - 1.2 mg/dL    Globulin 2.4 gm/dL    A/G Ratio 1.6 g/dL    BUN/Creatinine Ratio 22.2 7.0 - 25.0    Anion Gap 14.5 5.0 - 15.0 mmol/L    eGFR 39.5 (L) >60.0 mL/min/1.73   Protime-INR    Collection Time: 12/28/24  7:50 PM    Specimen: Arm, Right; Blood   Result Value Ref Range    Protime 13.8 12.1 - 14.7 Seconds    INR 1.05 0.90 - 1.10   BNP    Collection Time: 12/28/24  7:50 PM    Specimen: Arm, Right; Blood   Result Value Ref Range    proBNP 2,152.0 (H) 0.0 - 900.0 pg/mL   CBC Auto Differential    Collection Time: 12/28/24  7:50 PM    Specimen: Arm, Right; Blood   Result Value Ref Range    WBC 7.47 3.40 - 10.80 10*3/mm3    RBC 4.30 3.77 - 5.28 10*6/mm3    Hemoglobin 11.6 (L) 12.0 - 15.9 g/dL    Hematocrit 38.8 34.0 - 46.6 %    MCV 90.2 79.0 - 97.0 fL    MCH 27.0 26.6 - 33.0 pg    MCHC 29.9 (L) 31.5 - 35.7 g/dL    RDW 16.4 (H) 12.3 - 15.4 %    RDW-SD 54.2 (H) 37.0 - 54.0 fl    MPV 9.6 6.0 - 12.0 fL    Platelets 343 140 - 450 10*3/mm3    Neutrophil % 85.5 (H) 42.7 - 76.0 %    Lymphocyte % 9.0 (L) 19.6 - 45.3 %    Monocyte % 4.6 (L) 5.0 - 12.0 %    Eosinophil % 0.0 (L) 0.3 - 6.2 %    Basophil % 0.0 0.0 - 1.5 %    Immature Grans % 0.9 (H) 0.0 - 0.5 %    Neutrophils, Absolute 6.39 1.70 - 7.00 10*3/mm3    Lymphocytes,  Absolute 0.67 (L) 0.70 - 3.10 10*3/mm3    Monocytes, Absolute 0.34 0.10 - 0.90 10*3/mm3    Eosinophils, Absolute 0.00 0.00 - 0.40 10*3/mm3    Basophils, Absolute 0.00 0.00 - 0.20 10*3/mm3    Immature Grans, Absolute 0.07 (H) 0.00 - 0.05 10*3/mm3    nRBC 0.0 0.0 - 0.2 /100 WBC   High Sensitivity Troponin T 1Hr    Collection Time: 12/28/24  8:45 PM    Specimen: Arm, Right; Blood   Result Value Ref Range    HS Troponin T 15 (H) <14 ng/L    Troponin T Numeric Delta 3 (C) Abnormal if >/=3 ng/L     XR Chest 1 View   Final Result       No evidence of acute disease in the chest.       This report was finalized on 12/28/2024 8:57 PM by Barb Greer MD.              ED Course  ED Course as of 12/29/24 0107   Sun Dec 29, 2024   0036 Troponins are appropriate for chronic renal failure.  EKG showed no acute ischemia.  Patient is clinically stable for discharge and no chest pain or shortness of breath at this time recommend following up with her cardiologist and PCP on Monday for reevaluation. [LK]      ED Course User Index  [LK] Mary Quevedo DO                                                       Medical Decision Making      Final diagnoses:   Primary hypertension   Everest's disease   Rheumatoid arthritis involving both hands, unspecified whether rheumatoid factor present   Dyspepsia       ED Disposition  ED Disposition       ED Disposition   Discharge    Condition   Stable    Comment   --               Gabriella Pena MD  [address] [place] [address]   [facility] [address] [phone]               Medication List      No changes were made to your prescriptions during this visit.            Mary Quevedo DO  12/29/24 0107

## 2024-12-30 LAB
QT INTERVAL: 382 MS
QTC INTERVAL: 420 MS

## 2025-01-02 ENCOUNTER — OFFICE VISIT (OUTPATIENT)
Dept: SURGERY | Facility: CLINIC | Age: 70
End: 2025-01-02
Payer: MEDICARE

## 2025-01-02 DIAGNOSIS — R51.9 NONINTRACTABLE EPISODIC HEADACHE, UNSPECIFIED HEADACHE TYPE: Primary | ICD-10-CM

## 2025-01-02 PROCEDURE — 99024 POSTOP FOLLOW-UP VISIT: CPT | Performed by: SURGERY

## 2025-01-02 PROCEDURE — 1159F MED LIST DOCD IN RCRD: CPT | Performed by: SURGERY

## 2025-01-02 PROCEDURE — 1160F RVW MEDS BY RX/DR IN RCRD: CPT | Performed by: SURGERY

## 2025-01-02 NOTE — PROGRESS NOTES
"Pt presents to ED via EMS from [facility] for aggressive and uncontrollable behavior. Pt states she is "physiological, psychiatric, and psychological since the beginning of time." Pt presents with behavior similar to [id number], crying uncontrollably, and states she wants to be admitted to inpatient psych. NAD other than being emotionally upset. Pt is extremely somatic and states she is "hiding from the men that are trying to attack her body," pt unable to elaborate on who these men are.     Pt crying and repeatedly stating "I love my nurse"    Pt denies suicidal and homicidal ideation, chest pain, SOB, dizziness, and N/V. Pt complains of generalized body pain which is also stated to be "in the bone all alone"    Unable to complete initial admit assessments as the pt is unable to stay on topic and continuously speaks on random topics.      LOC:   · The pt is awake, alert, and aware of environment with an appropriate affect,  as well as speaking appropriately; AAOx3 to person, place, and situation  APPEARANCE:   · Pt resting comfortably and visibly upset; clean and well groomed  SKIN:   · Skin is warm and dry; color consistent with ethnicity; pt has normal skin turgor and moist mucus membranes  MUSCULOSKELETAL:   · Pt moving all extremities well  RESPIRATORY:   · Airway is open and patent; respirations are spontaneous; normal effort and rate noted; absent of accessory-muscle use  CARDIAC:   · Pt denies chest pain; normal heart sounds auscultated; Pt has no peripheral edema noted; capillary refill < 3 seconds  ABDOMEN:   · Soft and non-tender to palpation; no abnormal distention noted/reported; bowel sounds present x 4  NEUROLOGIC:   · PERRL, 3mm bilaterally, eyes open spontaneously; behavior appropriate to situation and pt follows commands; facial expression symmetrical    " Subjective   Neelam Gibbs is a 69 y.o. female.     Chief Complaint: post temporal artery biopsy    History of Present Illness She is a 70 yo who had a temporal artery biopsy last week. It did not show temporal arteritis.    The following portions of the patient's history were reviewed and updated as appropriate: current medications, past family history, past medical history, past social history, past surgical history and problem list.    Review of Systems    Objective   Physical Exam wound is healing well.     Past Medical History:   Diagnosis Date    Acute renal failure (ARF)     after allergic reaction to Bactrim    Adrenal cortical hypofunction     Anxiety and depression     Chronic kidney disease (CKD)     DDD (degenerative disc disease), lumbosacral     DJD (degenerative joint disease)     Hyperlipidemia     Hypothyroidism     Peripheral neuropathy        Family History   Problem Relation Age of Onset    Heart disease Mother     Edema Mother     Heart disease Father     Hypertension Father     Breast cancer Sister 39       Social History     Tobacco Use    Smoking status: Former     Passive exposure: Past    Smokeless tobacco: Current     Types: Chew   Vaping Use    Vaping status: Never Used   Substance Use Topics    Alcohol use: Never    Drug use: Never       Past Surgical History:   Procedure Laterality Date    BACK SURGERY      BREAST BIOPSY Left 2014    neg    EYE SURGERY Bilateral     SHOULDER SURGERY Left     TEMPORAL ARTERY BIOPSY Left 12/27/2024    Procedure: TEMPORAL ARTERY BIOPSY;  Surgeon: Eric Knox MD;  Location: [facility];  Service: General;  Laterality: Left;    TOTAL KNEE ARTHROPLASTY Left     WRIST SURGERY Right        Current Outpatient Medications   Medication Instructions    citalopram (CELEXA) 40 mg, Daily    cloNIDine (CATAPRES-TTS) 0.1 MG/24HR patch .03    cyanocobalamin 1000 MCG/ML injection No dose, route, or frequency recorded.    dexAMETHasone (DECADRON) 0.5 mg, Oral, Daily  With Breakfast    esomeprazole (nexIUM) 40 MG capsule No dose, route, or frequency recorded.    ezetimibe (ZETIA) 10 MG tablet No dose, route, or frequency recorded.    HYDROcodone-acetaminophen (NORCO)  MG per tablet 1 tablet, 3 Times Daily    HYDROcodone-acetaminophen (NORCO) 5-325 MG per tablet Take 1 tablet by mouth every 6 hours as needed for pain.    levothyroxine (SYNTHROID, LEVOTHROID) 50 mcg, Every Early Morning    lidocaine (LIDODERM) 5 % No dose, route, or frequency recorded.    predniSONE (DELTASONE) 2.5 mg, Daily    pregabalin (LYRICA) 75 mg, 2 Times Daily    vitamin D (ERGOCALCIFEROL) 1.25 MG (06886 UT) capsule capsule Tuesday only         Assessment & Plan   Diagnoses and all orders for this visit:    1. Nonintractable episodic headache, unspecified headache type (Primary)      Return prn           This document has been electronically signed by Eric Knox MD   January 2, 2025 15:05 EST

## 2025-02-11 ENCOUNTER — TELEPHONE (OUTPATIENT)
Dept: SURGERY | Facility: CLINIC | Age: 70
End: 2025-02-11
Payer: MEDICARE

## 2025-02-11 NOTE — TELEPHONE ENCOUNTER
Patient called and needs all records from Dr. Knox faxed to her optometrist, Dr. Earnest Cabrera at [facility].  All OV notes, SX notes, and pathology from her BX was faxed at [phone].

## 2025-06-04 ENCOUNTER — OFFICE VISIT (OUTPATIENT)
Dept: ENDOCRINOLOGY | Facility: CLINIC | Age: 70
End: 2025-06-04
Payer: MEDICARE

## 2025-06-04 VITALS
BODY MASS INDEX: 39.17 KG/M2 | DIASTOLIC BLOOD PRESSURE: 79 MMHG | HEART RATE: 85 BPM | WEIGHT: 181 LBS | OXYGEN SATURATION: 98 % | SYSTOLIC BLOOD PRESSURE: 127 MMHG

## 2025-06-04 DIAGNOSIS — E27.49 GLUCOCORTICOID DEFICIENCY: Primary | Chronic | ICD-10-CM

## 2025-06-04 NOTE — PROGRESS NOTES
Chief Complaint   Patient presents with    addisons     F/u        HPI   Neelam Gibbs is a 70 y.o. female had concerns including addisons (F/u).    Adrenal Insufficiency.    She is currently getting infusions from the rheumatology. She does not feel like this is helping yet but has only had 3 of these so far.  She is having increased pain still from her arthritis.    She is currently taking Dexamethasone 0.5 mg daily and is taking it without missing doses.  She denies and hypotensive episodes. She feels like she is doing overall well with this regimen.     History:  She has adrenal insufficiency dx'd over 10 years ago.  She was having issues with bruising so she had a change to her medication. She is taking dexamethasone 0.5 mg every day. She reports that weight is relatively stable.  She reports that energy level is poor, but attributes this to her back pain.  She is followed by pain management for back injections and spinal stimulator.  She reports that she is still notes easy bruising.    She recently had lab work at her PCP. Those were all in normal range.    She has not taken any meds for osteoporosis in the past. She reports that she had a DEXA Scan last week at her PCP office.      The following portions of the patient's history were reviewed and updated as appropriate: allergies, current medications, past family history, past medical history, past social history, past surgical history and problem list.    Review of Systems   Constitutional:  Positive for fatigue. Negative for unexpected weight gain and unexpected weight loss.        Leg swelling   Eyes:  Positive for visual disturbance.   Musculoskeletal:  Positive for arthralgias, back pain and joint swelling.   Skin:  Positive for bruise.   All other systems reviewed and are negative.     /79 (BP Location: Left arm, Patient Position: Sitting, Cuff Size: Adult)   Pulse 85   Wt 82.1 kg (181 lb)   LMP  (LMP Unknown)   SpO2 98%   BMI 39.17 kg/m²       Physical Exam  Vitals reviewed.   Constitutional:       Appearance: Normal appearance.      Comments: Cushingoid appearance.  Using as wheelchair.   Cardiovascular:      Rate and Rhythm: Normal rate.   Pulmonary:      Effort: Pulmonary effort is normal.   Skin:     Findings: Bruising present.   Neurological:      General: No focal deficit present.      Mental Status: She is alert and oriented to person, place, and time.   Psychiatric:         Mood and Affect: Mood normal.         Behavior: Behavior normal.         Thought Content: Thought content normal.         Judgment: Judgment normal.       Labs/Imaging      Assessment and Plan    Diagnoses and all orders for this visit:    1. Glucocorticoid deficiency (Primary)  Assessment & Plan:  She is stable symptomatically and labs remain stable. Will continue Dexamethasone to 0.5 mg QD. Discussed the importance of staying on the lowest dose possible for management of her condition.  She continue to appear cushiongoid and has increased bruises noted.  Follow-up in 6 months.        Return in about 6 months (around 12/4/2025) for Follow-up appointment. The patient was instructed to contact the clinic with any interval questions or concerns.      This document has been electronically signed by DOROTEO Sharma  June 5, 2025 07:43 EDT   Endocrinology    Please note that portions of this document were completed with a voice recognition program. Efforts were made to edit the dictations, but occasionally words are mis-transcribed.

## 2025-06-05 NOTE — ASSESSMENT & PLAN NOTE
She is stable symptomatically and labs remain stable. Will continue Dexamethasone to 0.5 mg QD. Discussed the importance of staying on the lowest dose possible for management of her condition.  She continue to appear cushiongoid and has increased bruises noted.  Follow-up in 6 months.

## (undated) DEVICE — DRAPE,UTILTY,TAPE,15X26, 4EA/PK: Brand: MEDLINE

## (undated) DEVICE — SUT VIC 4/0 RB1 27IN J214H

## (undated) DEVICE — PK HD AND NK 70

## (undated) DEVICE — STRIP,CLOSURE,WOUND,MEDI-STRIP,1/2X4: Brand: MEDLINE

## (undated) DEVICE — NDL HYPO ECLPS SFTY 18G 1 1/2IN

## (undated) DEVICE — SUT VIC 3/0 TIES J104T

## (undated) DEVICE — SUT VIC 3/0 SH 27IN J416H

## (undated) DEVICE — GLV SURG PREMIERPRO MIC LTX PF SZ7.5 BRN

## (undated) DEVICE — ELECTRD NDL EZ CLN MOD 2.75IN

## (undated) DEVICE — SYR LL TP 10ML STRL

## (undated) DEVICE — HOLDER: Brand: DEROYAL

## (undated) DEVICE — PENCL ES MEGADINE EZ/CLEAN BUTN W/HOLSTR 10FT